# Patient Record
Sex: FEMALE | Race: WHITE | Employment: OTHER | URBAN - METROPOLITAN AREA
[De-identification: names, ages, dates, MRNs, and addresses within clinical notes are randomized per-mention and may not be internally consistent; named-entity substitution may affect disease eponyms.]

---

## 2019-12-17 ENCOUNTER — HOSPITAL ENCOUNTER (EMERGENCY)
Age: 24
Discharge: HOME | End: 2019-12-17
Payer: MEDICAID

## 2019-12-17 ENCOUNTER — APPOINTMENT (OUTPATIENT)
Dept: GENERAL RADIOLOGY | Age: 24
End: 2019-12-17
Payer: MEDICAID

## 2019-12-17 VITALS
TEMPERATURE: 98.6 F | OXYGEN SATURATION: 98 % | SYSTOLIC BLOOD PRESSURE: 102 MMHG | BODY MASS INDEX: 20.53 KG/M2 | HEART RATE: 60 BPM | DIASTOLIC BLOOD PRESSURE: 57 MMHG | RESPIRATION RATE: 17 BRPM | WEIGHT: 135 LBS

## 2019-12-17 LAB
A/G RATIO: 1.3 (ref 1.1–2.2)
ALBUMIN SERPL-MCNC: 4.8 G/DL (ref 3.4–5)
ALP BLD-CCNC: 53 U/L (ref 40–129)
ALT SERPL-CCNC: 9 U/L (ref 10–40)
ANION GAP SERPL CALCULATED.3IONS-SCNC: 12 MMOL/L (ref 3–16)
AST SERPL-CCNC: 20 U/L (ref 15–37)
BASOPHILS ABSOLUTE: 0 K/UL (ref 0–0.2)
BASOPHILS RELATIVE PERCENT: 0.3 %
BILIRUB SERPL-MCNC: 0.3 MG/DL (ref 0–1)
BILIRUBIN URINE: NEGATIVE
BLOOD, URINE: NEGATIVE
BUN BLDV-MCNC: 9 MG/DL (ref 7–20)
CALCIUM SERPL-MCNC: 9.1 MG/DL (ref 8.3–10.6)
CHLORIDE BLD-SCNC: 101 MMOL/L (ref 99–110)
CLARITY: CLEAR
CO2: 25 MMOL/L (ref 21–32)
COLOR: YELLOW
CREAT SERPL-MCNC: 0.6 MG/DL (ref 0.6–1.1)
EOSINOPHILS ABSOLUTE: 0.1 K/UL (ref 0–0.6)
EOSINOPHILS RELATIVE PERCENT: 1.6 %
GFR AFRICAN AMERICAN: >60
GFR NON-AFRICAN AMERICAN: >60
GLOBULIN: 3.6 G/DL
GLUCOSE BLD-MCNC: 86 MG/DL (ref 70–99)
GLUCOSE URINE: NEGATIVE MG/DL
HCG QUALITATIVE: NEGATIVE
HCT VFR BLD CALC: 43.2 % (ref 36–48)
HEMOGLOBIN: 15.2 G/DL (ref 12–16)
KETONES, URINE: NEGATIVE MG/DL
LEUKOCYTE ESTERASE, URINE: NEGATIVE
LIPASE: 21 U/L (ref 13–60)
LYMPHOCYTES ABSOLUTE: 0.5 K/UL (ref 1–5.1)
LYMPHOCYTES RELATIVE PERCENT: 6.4 %
MCH RBC QN AUTO: 32 PG (ref 26–34)
MCHC RBC AUTO-ENTMCNC: 35.2 G/DL (ref 31–36)
MCV RBC AUTO: 90.9 FL (ref 80–100)
MICROSCOPIC EXAMINATION: NORMAL
MONO TEST: NEGATIVE
MONOCYTES ABSOLUTE: 0.6 K/UL (ref 0–1.3)
MONOCYTES RELATIVE PERCENT: 7.1 %
NEUTROPHILS ABSOLUTE: 6.6 K/UL (ref 1.7–7.7)
NEUTROPHILS RELATIVE PERCENT: 84.6 %
NITRITE, URINE: NEGATIVE
PDW BLD-RTO: 13.2 % (ref 12.4–15.4)
PH UA: 6 (ref 5–8)
PLATELET # BLD: 229 K/UL (ref 135–450)
PMV BLD AUTO: 8.3 FL (ref 5–10.5)
POTASSIUM REFLEX MAGNESIUM: 4 MMOL/L (ref 3.5–5.1)
PROTEIN UA: NEGATIVE MG/DL
RAPID INFLUENZA  B AGN: NEGATIVE
RAPID INFLUENZA A AGN: NEGATIVE
RBC # BLD: 4.75 M/UL (ref 4–5.2)
SODIUM BLD-SCNC: 138 MMOL/L (ref 136–145)
SPECIFIC GRAVITY UA: 1.02 (ref 1–1.03)
TOTAL PROTEIN: 8.4 G/DL (ref 6.4–8.2)
URINE REFLEX TO CULTURE: NORMAL
URINE TYPE: NORMAL
UROBILINOGEN, URINE: 0.2 E.U./DL
WBC # BLD: 7.8 K/UL (ref 4–11)

## 2019-12-17 PROCEDURE — 6360000002 HC RX W HCPCS: Performed by: PHYSICIAN ASSISTANT

## 2019-12-17 PROCEDURE — 85025 COMPLETE CBC W/AUTO DIFF WBC: CPT

## 2019-12-17 PROCEDURE — 81003 URINALYSIS AUTO W/O SCOPE: CPT

## 2019-12-17 PROCEDURE — 86308 HETEROPHILE ANTIBODY SCREEN: CPT

## 2019-12-17 PROCEDURE — 87804 INFLUENZA ASSAY W/OPTIC: CPT

## 2019-12-17 PROCEDURE — 36415 COLL VENOUS BLD VENIPUNCTURE: CPT

## 2019-12-17 PROCEDURE — 96375 TX/PRO/DX INJ NEW DRUG ADDON: CPT

## 2019-12-17 PROCEDURE — 96374 THER/PROPH/DIAG INJ IV PUSH: CPT

## 2019-12-17 PROCEDURE — 83690 ASSAY OF LIPASE: CPT

## 2019-12-17 PROCEDURE — 96361 HYDRATE IV INFUSION ADD-ON: CPT

## 2019-12-17 PROCEDURE — 80053 COMPREHEN METABOLIC PANEL: CPT

## 2019-12-17 PROCEDURE — 84703 CHORIONIC GONADOTROPIN ASSAY: CPT

## 2019-12-17 PROCEDURE — 99284 EMERGENCY DEPT VISIT MOD MDM: CPT

## 2019-12-17 PROCEDURE — 71046 X-RAY EXAM CHEST 2 VIEWS: CPT

## 2019-12-17 PROCEDURE — 2580000003 HC RX 258: Performed by: PHYSICIAN ASSISTANT

## 2019-12-17 RX ORDER — ACETAMINOPHEN 325 MG/1
650 TABLET ORAL ONCE
Status: DISCONTINUED | OUTPATIENT
Start: 2019-12-17 | End: 2019-12-17 | Stop reason: HOSPADM

## 2019-12-17 RX ORDER — KETOROLAC TROMETHAMINE 30 MG/ML
15 INJECTION, SOLUTION INTRAMUSCULAR; INTRAVENOUS ONCE
Status: DISCONTINUED | OUTPATIENT
Start: 2019-12-17 | End: 2019-12-17

## 2019-12-17 RX ORDER — ONDANSETRON 2 MG/ML
4 INJECTION INTRAMUSCULAR; INTRAVENOUS ONCE
Status: COMPLETED | OUTPATIENT
Start: 2019-12-17 | End: 2019-12-17

## 2019-12-17 RX ORDER — MORPHINE SULFATE 4 MG/ML
4 INJECTION, SOLUTION INTRAMUSCULAR; INTRAVENOUS ONCE
Status: COMPLETED | OUTPATIENT
Start: 2019-12-17 | End: 2019-12-17

## 2019-12-17 RX ORDER — ONDANSETRON 4 MG/1
4 TABLET, ORALLY DISINTEGRATING ORAL EVERY 8 HOURS PRN
Qty: 15 TABLET | Refills: 0 | Status: SHIPPED | OUTPATIENT
Start: 2019-12-17 | End: 2020-09-25 | Stop reason: SDUPTHER

## 2019-12-17 RX ORDER — 0.9 % SODIUM CHLORIDE 0.9 %
1000 INTRAVENOUS SOLUTION INTRAVENOUS ONCE
Status: COMPLETED | OUTPATIENT
Start: 2019-12-17 | End: 2019-12-17

## 2019-12-17 RX ADMIN — ONDANSETRON 4 MG: 2 INJECTION INTRAMUSCULAR; INTRAVENOUS at 12:11

## 2019-12-17 RX ADMIN — MORPHINE SULFATE 4 MG: 4 INJECTION, SOLUTION INTRAMUSCULAR; INTRAVENOUS at 12:12

## 2019-12-17 RX ADMIN — SODIUM CHLORIDE 1000 ML: 9 INJECTION, SOLUTION INTRAVENOUS at 12:10

## 2019-12-17 ASSESSMENT — PAIN SCALES - GENERAL
PAINLEVEL_OUTOF10: 2
PAINLEVEL_OUTOF10: 3

## 2019-12-17 ASSESSMENT — ENCOUNTER SYMPTOMS
SORE THROAT: 1
COUGH: 1
COLOR CHANGE: 0
SHORTNESS OF BREATH: 0
DIARRHEA: 1
ABDOMINAL PAIN: 1
EYES NEGATIVE: 1
BACK PAIN: 1
VOMITING: 1
NAUSEA: 1

## 2019-12-17 NOTE — ED PROVIDER NOTES
[facility]  ED  EMERGENCY DEPARTMENT ENCOUNTER        Pt Name: Mattie Douglas  MRN: [id number]  [place] 1995  Date of evaluation: 12/17/2019  Provider: Valerie Mathew PA-C  PCP: No primary care provider on file. ED Attending: Chelo Mukherjee MD      This patient was not seen by the attending provider    History provided by the patient    CHIEF COMPLAINT:     Chief Complaint   Patient presents with    Emesis     started to get ill a week ago with uri symptoms, progressed into vomiting and diarrhea       HISTORY OF PRESENT ILLNESS:      Mattie Douglas is a 21 y.o. female who arrives to the ED by private vehicle with her mother. The patient reports living in [place] though has been in [place] since June of this year. She has been sick for the last 1 week with what she describes as upper respiratory symptoms including a sore throat, congestion and coughing. She additionally has a history of cyclical vomiting syndrome and describes beginning to feel ill early this morning. She has intermittent abdominal cramping that radiates to and from her mid to low back. She states she has had the symptoms for several months if not longer when she gets sick with her cyclical vomiting. She has been vomiting since this morning but has not tried anything for symptoms despite reportedly having Zofran at home. She denies any persistent fevers, headaches. She denies being in contact with anyone else known to be ill. He denies recent travel. Nursing Notes were reviewed     REVIEW OF SYSTEMS:     Review of Systems   Constitutional: Positive for activity change and appetite change. Negative for chills and fever. HENT: Positive for congestion and sore throat. Eyes: Negative. Respiratory: Positive for cough. Negative for shortness of breath. Cardiovascular: Negative for chest pain. Gastrointestinal: Positive for abdominal pain, diarrhea, nausea and vomiting.    Genitourinary: Negative for difficulty urinating and dysuria. Musculoskeletal: Positive for back pain and myalgias. Negative for gait problem, joint swelling and neck pain. Skin: Negative for color change and rash. Neurological: Negative for dizziness, weakness and headaches. All other systems reviewed and are negative. Except as noted above in the ROS, all other systems were reviewed and negative. PAST MEDICAL HISTORY:     Past Medical History:   Diagnosis Date    Cyclical vomiting     Depression age 11    Anxiety    History of chicken pox 96         SURGICAL HISTORY:    History reviewed. No pertinent surgical history.       CURRENT MEDICATIONS:       Discharge Medication List as of 2019 12:54 PM            ALLERGIES:    Phenergan [promethazine] and Zithromax [azithromycin]    FAMILY HISTORY:       Family History   Problem Relation Age of Onset    Cancer Mother 28        Melanoma    High Cholesterol Mother     Thyroid Disease Mother         Thyroid Removed, Hypothyroid    Depression Mother     Alcohol Abuse Father     Colon Cancer Maternal Grandmother 61         age 64 of colon cancer    Stroke Maternal Grandmother     Asthma Maternal Grandmother     Heart Disease Maternal Grandfather 61        MI    High Blood Pressure Maternal Grandfather           SOCIAL HISTORY:       Social History     Socioeconomic History    Marital status: Single     Spouse name: None    Number of children: 0    Years of education: HS Grad    Highest education level: None   Occupational History    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 Actress   Social Needs    Financial resource strain: None    Food insecurity:     Worry: None     Inability: None    Transportation needs:     Medical: None     Non-medical: None   Tobacco Use    Smoking status: Former Smoker     Packs/day: 1.00     Years: 2.00     Pack years: 2.00     Types: Cigarettes     Start date: 2011     Last attempt to quit: 2014     Years since quittin.4    Smokeless tobacco: Never Used   Substance and Sexual Activity    Alcohol use: Yes     Alcohol/week: 0.8 standard drinks     Types: 1 Standard drinks or equivalent per week     Comment: occasional, weekends    Drug use: No    Sexual activity: Not Currently     Partners: Male   Lifestyle    Physical activity:     Days per week: None     Minutes per session: None    Stress: None   Relationships    Social connections:     Talks on phone: None     Gets together: None     Attends Orthodox service: None     Active member of club or organization: None     Attends meetings of clubs or organizations: None     Relationship status: None    Intimate partner violence:     Fear of current or ex partner: None     Emotionally abused: None     Physically abused: None     Forced sexual activity: None   Other Topics Concern    None   Social History Narrative    None       SCREENINGS:    [place] Coma Scale  Eye Opening: Spontaneous  Best Verbal Response: Oriented  Best Motor Response: Obeys commands  [place] Coma Scale Score: 15        PHYSICAL EXAM:       ED Triage Vitals [19 1139]   BP Temp Temp Source Pulse Resp SpO2 Height Weight   117/66 98.6 °F (37 °C) Oral 84 19 99 % -- 135 lb (61.2 kg)       Physical Exam    CONSTITUTIONAL: Awake and alert. Cooperative. Well-developed. Well-nourished. Non-toxic. No acute distress. HENT: Normocephalic. Atraumatic. External ears normal, without discharge. No nasal discharge. Oropharynx clear. Mucous membranes moist.  No swelling of the posterior pharynx. No erythema or exudate. EYES: Conjunctiva non-injected. No scleral icterus. PERRL. EOM's grossly intact. NECK: Supple. Normal ROM. CARDIOVASCULAR: RRR. No [place]. Intact distal pulses. PULMONARY/CHEST WALL: Effort normal. No tachypnea. Lungs clear to ausculation. ABDOMEN: Normal BS. Soft. Nondistended. No tenderness to palpate. No guarding. No rebound. No rigidity. No CVA tenderness.   /ANORECTAL: Not assessed  MUSKULOSKELETAL: Normal ROM. No acute deformities. No edema. No tenderness to palpate. SKIN: Warm and dry. No rash. NEUROLOGICAL: Alert and oriented x 3. GCS 15. CN II-XII grossly intact. Strength is 5/5 in all extremities and sensation is intact. Normal gait.    PSYCHIATRIC: Normal affect        DIAGNOSTICRESULTS:     LABS:    Results for orders placed or performed during the hospital encounter of 12/17/19   Rapid influenza A/B antigens   Result Value Ref Range    Rapid Influenza A Ag Negative Negative    Rapid Influenza B Ag Negative Negative   CBC Auto Differential   Result Value Ref Range    WBC 7.8 4.0 - 11.0 K/uL    RBC 4.75 4.00 - 5.20 M/uL    Hemoglobin 15.2 12.0 - 16.0 g/dL    Hematocrit 43.2 36.0 - 48.0 %    MCV 90.9 80.0 - 100.0 fL    MCH 32.0 26.0 - 34.0 pg    MCHC 35.2 31.0 - 36.0 g/dL    RDW 13.2 12.4 - 15.4 %    Platelets 511 534 - 741 K/uL    MPV 8.3 5.0 - 10.5 fL    Neutrophils % 84.6 %    Lymphocytes % 6.4 %    Monocytes % 7.1 %    Eosinophils % 1.6 %    Basophils % 0.3 %    Neutrophils Absolute 6.6 1.7 - 7.7 K/uL    Lymphocytes Absolute 0.5 (L) 1.0 - 5.1 K/uL    Monocytes Absolute 0.6 0.0 - 1.3 K/uL    Eosinophils Absolute 0.1 0.0 - 0.6 K/uL    Basophils Absolute 0.0 0.0 - 0.2 K/uL   Comprehensive Metabolic Panel w/ Reflex to MG   Result Value Ref Range    Sodium 138 136 - 145 mmol/L    Potassium reflex Magnesium 4.0 3.5 - 5.1 mmol/L    Chloride 101 99 - 110 mmol/L    CO2 25 21 - 32 mmol/L    Anion Gap 12 3 - 16    Glucose 86 70 - 99 mg/dL    BUN 9 7 - 20 mg/dL    CREATININE 0.6 0.6 - 1.1 mg/dL    GFR Non-African American >60 >60    GFR African American >60 >60    Calcium 9.1 8.3 - 10.6 mg/dL    Total Protein 8.4 (H) 6.4 - 8.2 g/dL    Alb 4.8 3.4 - 5.0 g/dL    Albumin/Globulin Ratio 1.3 1.1 - 2.2    Total Bilirubin 0.3 0.0 - 1.0 mg/dL    Alkaline Phosphatase 53 40 - 129 U/L    ALT 9 (L) 10 - 40 U/L    AST 20 15 - 37 U/L    Globulin 3.6 g/dL   Lipase   Result Value Ref Range    Lipase no evidence or peritonitis, sepsis, or toxicity. Chip Marquis and I have discussed the diagnosis and risks, and we agree with discharging home to follow-up with their primary doctor. We also discussed returning to the Emergency Department immediately if new or worsening symptoms occur. We have discussed the symptoms which are most concerning (e.g., changing or worsening pain, trouble swallowing or breathing, neck stiffness, worsening fever) that necessitate immediate return. FINAL IMPRESSION:      1. Acute upper respiratory infection    2. Non-intractable vomiting with nausea, unspecified vomiting type    3. Non-intractable cyclical vomiting    4.  Abdominal cramping          DISPOSITION/PLAN:   DISPOSITION Decision To Discharge      PATIENT REFERRED TO:  [facility]) Pre-Services  [address]  ED  [address]  Go to   If symptoms worsen      DISCHARGE MEDICATIONS:  Discharge Medication List as of 12/17/2019 12:54 PM      START taking these medications    Details   ondansetron (ZOFRAN ODT) 4 MG disintegrating tablet Take 1 tablet by mouth every 8 hours as needed for Nausea or Vomiting, Disp-15 tablet, R-0Print                        (Please note thatportions of this note were completed with a voice recognition program.  Efforts were made to edit the dictations, but occasionally words are mis-transcribed.)    Marc Castellanos PA-C (electronicallysigned)              Janae Guevara  12/17/19 [id number]

## 2020-09-25 ENCOUNTER — APPOINTMENT (OUTPATIENT)
Dept: CT IMAGING | Age: 25
End: 2020-09-25
Payer: MEDICAID

## 2020-09-25 ENCOUNTER — HOSPITAL ENCOUNTER (EMERGENCY)
Age: 25
Discharge: HOME | End: 2020-09-25
Payer: MEDICAID

## 2020-09-25 VITALS
HEIGHT: 68 IN | OXYGEN SATURATION: 97 % | BODY MASS INDEX: 20.61 KG/M2 | WEIGHT: 136 LBS | HEART RATE: 66 BPM | RESPIRATION RATE: 20 BRPM | TEMPERATURE: 98.4 F | SYSTOLIC BLOOD PRESSURE: 104 MMHG | DIASTOLIC BLOOD PRESSURE: 65 MMHG

## 2020-09-25 LAB
A/G RATIO: 1.6 (ref 1.1–2.2)
ALBUMIN SERPL-MCNC: 4.4 G/DL (ref 3.4–5)
ALP BLD-CCNC: 46 U/L (ref 40–129)
ALT SERPL-CCNC: 18 U/L (ref 10–40)
ANION GAP SERPL CALCULATED.3IONS-SCNC: 11 MMOL/L (ref 3–16)
AST SERPL-CCNC: 50 U/L (ref 15–37)
BASOPHILS ABSOLUTE: 0.1 K/UL (ref 0–0.2)
BASOPHILS RELATIVE PERCENT: 1.1 %
BILIRUB SERPL-MCNC: 0.3 MG/DL (ref 0–1)
BILIRUBIN URINE: NEGATIVE
BLOOD, URINE: NEGATIVE
BUN BLDV-MCNC: 14 MG/DL (ref 7–20)
CALCIUM SERPL-MCNC: 8.8 MG/DL (ref 8.3–10.6)
CHLORIDE BLD-SCNC: 100 MMOL/L (ref 99–110)
CLARITY: CLEAR
CO2: 24 MMOL/L (ref 21–32)
COLOR: YELLOW
CREAT SERPL-MCNC: 0.7 MG/DL (ref 0.6–1.1)
EOSINOPHILS ABSOLUTE: 0.1 K/UL (ref 0–0.6)
EOSINOPHILS RELATIVE PERCENT: 1.6 %
GFR AFRICAN AMERICAN: >60
GFR NON-AFRICAN AMERICAN: >60
GLOBULIN: 2.8 G/DL
GLUCOSE BLD-MCNC: 101 MG/DL (ref 70–99)
GLUCOSE URINE: NEGATIVE MG/DL
HCG(URINE) PREGNANCY TEST: NEGATIVE
HCT VFR BLD CALC: 37.2 % (ref 36–48)
HEMOGLOBIN: 12.7 G/DL (ref 12–16)
KETONES, URINE: ABNORMAL MG/DL
LEUKOCYTE ESTERASE, URINE: NEGATIVE
LYMPHOCYTES ABSOLUTE: 1.9 K/UL (ref 1–5.1)
LYMPHOCYTES RELATIVE PERCENT: 39.7 %
MCH RBC QN AUTO: 31.8 PG (ref 26–34)
MCHC RBC AUTO-ENTMCNC: 34.2 G/DL (ref 31–36)
MCV RBC AUTO: 92.9 FL (ref 80–100)
MICROSCOPIC EXAMINATION: ABNORMAL
MONOCYTES ABSOLUTE: 0.3 K/UL (ref 0–1.3)
MONOCYTES RELATIVE PERCENT: 7.4 %
NEUTROPHILS ABSOLUTE: 2.3 K/UL (ref 1.7–7.7)
NEUTROPHILS RELATIVE PERCENT: 50.2 %
NITRITE, URINE: NEGATIVE
PDW BLD-RTO: 12.5 % (ref 12.4–15.4)
PH UA: 5.5 (ref 5–8)
PLATELET # BLD: 236 K/UL (ref 135–450)
PMV BLD AUTO: 8.4 FL (ref 5–10.5)
POTASSIUM SERPL-SCNC: 5.1 MMOL/L (ref 3.5–5.1)
PROTEIN UA: NEGATIVE MG/DL
RBC # BLD: 4.01 M/UL (ref 4–5.2)
SODIUM BLD-SCNC: 135 MMOL/L (ref 136–145)
SPECIFIC GRAVITY UA: >=1.03 (ref 1–1.03)
TOTAL PROTEIN: 7.2 G/DL (ref 6.4–8.2)
URINE TYPE: ABNORMAL
UROBILINOGEN, URINE: 0.2 E.U./DL
WBC # BLD: 4.7 K/UL (ref 4–11)

## 2020-09-25 PROCEDURE — 80053 COMPREHEN METABOLIC PANEL: CPT

## 2020-09-25 PROCEDURE — 85025 COMPLETE CBC W/AUTO DIFF WBC: CPT

## 2020-09-25 PROCEDURE — 99284 EMERGENCY DEPT VISIT MOD MDM: CPT

## 2020-09-25 PROCEDURE — 84703 CHORIONIC GONADOTROPIN ASSAY: CPT

## 2020-09-25 PROCEDURE — 81003 URINALYSIS AUTO W/O SCOPE: CPT

## 2020-09-25 PROCEDURE — 74176 CT ABD & PELVIS W/O CONTRAST: CPT

## 2020-09-25 RX ORDER — KETOROLAC TROMETHAMINE 30 MG/ML
30 INJECTION, SOLUTION INTRAMUSCULAR; INTRAVENOUS ONCE
Status: DISCONTINUED | OUTPATIENT
Start: 2020-09-25 | End: 2020-09-25 | Stop reason: HOSPADM

## 2020-09-25 RX ORDER — ONDANSETRON 2 MG/ML
4 INJECTION INTRAMUSCULAR; INTRAVENOUS EVERY 30 MIN PRN
Status: DISCONTINUED | OUTPATIENT
Start: 2020-09-25 | End: 2020-09-25 | Stop reason: HOSPADM

## 2020-09-25 RX ORDER — ONDANSETRON 4 MG/1
4 TABLET, ORALLY DISINTEGRATING ORAL EVERY 8 HOURS PRN
Qty: 15 TABLET | Refills: 0 | Status: SHIPPED | OUTPATIENT
Start: 2020-09-25 | End: 2021-07-16 | Stop reason: SDUPTHER

## 2020-09-25 ASSESSMENT — PAIN DESCRIPTION - ORIENTATION: ORIENTATION: LEFT;LOWER

## 2020-09-25 ASSESSMENT — PAIN SCALES - GENERAL
PAINLEVEL_OUTOF10: 7
PAINLEVEL_OUTOF10: 0

## 2020-09-25 ASSESSMENT — PAIN DESCRIPTION - PAIN TYPE: TYPE: ACUTE PAIN

## 2020-09-25 ASSESSMENT — PAIN DESCRIPTION - ONSET: ONSET: SUDDEN

## 2020-09-25 ASSESSMENT — PAIN DESCRIPTION - LOCATION: LOCATION: FLANK

## 2020-09-25 ASSESSMENT — PAIN DESCRIPTION - PROGRESSION: CLINICAL_PROGRESSION: GRADUALLY WORSENING

## 2020-09-25 ASSESSMENT — PAIN DESCRIPTION - DESCRIPTORS: DESCRIPTORS: SHARP;RADIATING

## 2020-09-25 ASSESSMENT — PAIN DESCRIPTION - FREQUENCY: FREQUENCY: CONTINUOUS

## 2020-09-25 NOTE — ED PROVIDER NOTES
Evaluated by [id number] Provider    [facility]  ED    CHIEF COMPLAINT  Flank Pain (Pt states she was sitting and watching TV PTA and a sudden L sided sharp pain began, worsens with movement.)    HISTORY OF PRESENT ILLNESS  Lilia Zaragoza is a 31 y.o. female who presents to the ED complaining of left side flank and abdominal pain. Having stabbing pains in the left side that started suddenly. Pain has gotten better and then worsened at times, currently reporting an aching type of pain. Nausea due to the pain, better now, no vomiting. Denies dysuria. Denies history of kidney stones. Sitting still and not straightening up all the way makes the pain better. The patient is currently rating their pain as 5/10 and describes it as an aching, stabbing type of pain. Treatments tried prior to arrival in the ED: none. The patient denies other complaints, modifying factors or associated symptoms. The patient arrived to the ED via private car. Patient is accompanied by SO whois/are bedside for the visit. PAST MEDICAL HISTORY    Past Medical History:   Diagnosis Date    Cyclical vomiting     Depression age 11    Anxiety    History of chicken pox 96       SURGICAL HISTORY    History reviewed. No pertinent surgical history.     CURRENT MEDICATIONS    Current Outpatient Rx   Medication Sig Dispense Refill    ondansetron (ZOFRAN ODT) 4 MG disintegrating tablet Take 1 tablet by mouth every 8 hours as needed for Nausea or Vomiting 15 tablet 0       ALLERGIES    Allergies   Allergen Reactions    Phenergan [Promethazine] Anxiety     agitated    Zithromax [Azithromycin] Anxiety       FAMILY HISTORY    Family History   Problem Relation Age of Onset    Cancer Mother 28        Melanoma    High Cholesterol Mother     Thyroid Disease Mother         Thyroid Removed, Hypothyroid    Depression Mother     Alcohol Abuse Father     Colon Cancer Maternal Grandmother 61         age 64 of colon cancer    Stroke Maternal Grandmother     Asthma Maternal Grandmother     Heart Disease Maternal Grandfather 61        MI    High Blood Pressure Maternal Grandfather        SOCIAL HISTORY    Social History     Socioeconomic History    Marital status: Single     Spouse name: None    Number of children: 0    Years of education: HS Grad    Highest education level: None   Occupational History    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 Actress   Social Needs    Financial resource strain: None    Food insecurity     Worry: None     Inability: None    Transportation needs     Medical: None     Non-medical: None   Tobacco Use    Smoking status: Former Smoker     Packs/day: 1.00     Years: 2.00     Pack years: 2.00     Types: Cigarettes     Start date: 2011     Last attempt to quit: 2014     Years since quittin.2    Smokeless tobacco: Never Used   Substance and Sexual Activity    Alcohol use: Yes     Alcohol/week: 0.8 standard drinks     Types: 1 Standard drinks or equivalent per week     Comment: occasional, weekends    Drug use: No    Sexual activity: Not Currently     Partners: Male   Lifestyle    Physical activity     Days per week: None     Minutes per session: None    Stress: None   Relationships    Social connections     Talks on phone: None     Gets together: None     Attends Orthodox service: None     Active member of club or organization: None     Attends meetings of clubs or organizations: None     Relationship status: None    Intimate partner violence     Fear of current or ex partner: None     Emotionally abused: None     Physically abused: None     Forced sexual activity: None   Other Topics Concern    None   Social History Narrative    None       REVIEW OFSYSTEMS    10systems reviewed, pertinent positives per HPI otherwise noted to be negative.     PHYSICAL EXAM  Physical Exam  Vitals:    20   BP: 105/70   Pulse: 71   Resp: 20   Temp:    SpO2: 96% GENERAL: Patient is well-developed, well-nourished. Awake andalert. Cooperative. Resting in bed. No apparent distress. HEENT:  Normocephalic, atraumatic. Conjunctivaappear normal. Sclera is non-icteric. External ears are normal.    NECK: Supple with normal ROM. Tracheamidline  LUNGS: Equal and symmetric chest rise. Breathing is unlabored. Speaking comfortably in fullsentences. Lungs are clear bilaterally to auscultation. Without wheezing, rales, or rhonchi. CADIOVASCULAR:  Regular rate and rhythm. Normal S1-S2 sounds. No murmurs, rubs, or gallops. GI: Soft, left flank tenderness to palpation, nondistended with positive bowel sounds. No rebound tenderness, guarding or rigidity. Negative Concepcion's sign. Negative Rovsing's sign. No CVAT to palpation. No masses or hepatosplenomegaly to palpation. MUSCULOSKELETAL:  No gross deformities or trauma noted. Moving all extremities equally and appropriately. Normal ROM. SKIN: Warm/dry. Skin is intact. No rashes or lesions noted. PSYCHIATRIC: Mood and affect appropriate. Speech is clear and articulate. NEUROLOGIC: Alert and oriented. No focal motor or sensory deficits.      LABS   Results for orders placed or performed during the hospital encounter of 09/25/20   Urinalysis, reflex to microscopic   Result Value Ref Range    Color, UA Yellow Straw/Yellow    Clarity, UA Clear Clear    Glucose, Ur Negative Negative mg/dL    Bilirubin Urine Negative Negative    Ketones, Urine TRACE (A) Negative mg/dL    Specific Gravity, UA >=1.030 1.005 - 1.030    Blood, Urine Negative Negative    pH, UA 5.5 5.0 - 8.0    Protein, UA Negative Negative mg/dL    Urobilinogen, Urine 0.2 <2.0 E.U./dL    Nitrite, Urine Negative Negative    Leukocyte Esterase, Urine Negative Negative    Microscopic Examination Not Indicated     Urine Type NotGiven    Pregnancy, Urine   Result Value Ref Range    HCG(Urine) Pregnancy Test Negative Detects HCG level >20 MIU/mL   CBC Auto Differential Result Value Ref Range    WBC 4.7 4.0 - 11.0 K/uL    RBC 4.01 4.00 - 5.20 M/uL    Hemoglobin 12.7 12.0 - 16.0 g/dL    Hematocrit 37.2 36.0 - 48.0 %    MCV 92.9 80.0 - 100.0 fL    MCH 31.8 26.0 - 34.0 pg    MCHC 34.2 31.0 - 36.0 g/dL    RDW 12.5 12.4 - 15.4 %    Platelets 845 892 - 998 K/uL    MPV 8.4 5.0 - 10.5 fL    Neutrophils % 50.2 %    Lymphocytes % 39.7 %    Monocytes % 7.4 %    Eosinophils % 1.6 %    Basophils % 1.1 %    Neutrophils Absolute 2.3 1.7 - 7.7 K/uL    Lymphocytes Absolute 1.9 1.0 - 5.1 K/uL    Monocytes Absolute 0.3 0.0 - 1.3 K/uL    Eosinophils Absolute 0.1 0.0 - 0.6 K/uL    Basophils Absolute 0.1 0.0 - 0.2 K/uL   Comprehensive Metabolic Panel   Result Value Ref Range    Sodium 135 (L) 136 - 145 mmol/L    Potassium 5.1 3.5 - 5.1 mmol/L    Chloride 100 99 - 110 mmol/L    CO2 24 21 - 32 mmol/L    Anion Gap 11 3 - 16    Glucose 101 (H) 70 - 99 mg/dL    BUN 14 7 - 20 mg/dL    CREATININE 0.7 0.6 - 1.1 mg/dL    GFR Non-African American >60 >60    GFR African American >60 >60    Calcium 8.8 8.3 - 10.6 mg/dL    Total Protein 7.2 6.4 - 8.2 g/dL    Alb 4.4 3.4 - 5.0 g/dL    Albumin/Globulin Ratio 1.6 1.1 - 2.2    Total Bilirubin 0.3 0.0 - 1.0 mg/dL    Alkaline Phosphatase 46 40 - 129 U/L    ALT 18 10 - 40 U/L    AST 50 (H) 15 - 37 U/L    Globulin 2.8 g/dL       RADIOLOGY    Ct Abdomen Pelvis Wo Contrast Additional Contrast? None    Result Date: 9/25/2020  EXAMINATION: CT OF THE ABDOMEN AND PELVIS WITHOUT CONTRAST 9/25/2020 5:05 pm TECHNIQUE: CT of the abdomen and pelvis was performed without the administration of intravenous contrast. Multiplanar reformatted images are provided for review. Dose modulation, iterative reconstruction, and/or weight based adjustment of the mA/kV was utilized to reduce the radiation dose to as low as reasonably achievable. COMPARISON: None.  HISTORY: ORDERING SYSTEM PROVIDED HISTORY: left flank pain, r/o stone TECHNOLOGIST PROVIDED HISTORY: Reason for exam:->left flank pain, medication/s. I did [place] patient on appropriate use of these medication. New Prescriptions    No medications on file       I estimatethere is LOW risk for ACUTE APPENDICITIS, BOWEL OBSTRUCTION, CHOLECYSTITIS, DIVERTICULITIS, INCARCERATED HERNIA, PANCREATITIS, or PERFORATED BOWEL or ULCER, thus I consider the discharge disposition reasonable. Also, thereis no evidence or peritonitis, sepsis, or toxicity. Evelyn Pierce and I have discussed the diagnosis and risks, and we agree with discharging home to follow-up with their primary doctor. We also discussed returning to the EmergencyDepartment immediately if new or worsening symptoms occur. We have discussed the symptoms which are most concerning (e.g., bloody stool, fever, changing or worsening pain, vomiting) that necessitate immediate return. CLINICAL IMPRESSION    1. Left flank pain    2. Nausea           Discharge Vitals:  Blood pressure 105/70, pulse 71, temperature 98.5 °F (36.9 °C), temperature source Oral, resp. rate 20, height 5' 8\" (1.727 m), weight 136 lb (61.7 kg), last menstrual period 09/04/2020, SpO2 96 %, not currently breastfeeding. [address] Pre-Services  [phone]  Call on 9/28/2020      [facility]  ED  [address] [facility] [address]  Go to   If symptoms worsen      DISPOSITION  Patient was discharged to home in good condition. Comment: Pleasenote this report has been produced using speech recognition software and may contain errors related to that system including errors in grammar, punctuation, and spelling, as well as words and phrases that may beinappropriate. If there are any questions or concerns please feel free to contact the dictating provider for clarification.         SERENA Bentley CNP  09/25/20 [id number]

## 2021-07-16 ENCOUNTER — HOSPITAL ENCOUNTER (EMERGENCY)
Age: 26
Discharge: HOME | End: 2021-07-17
Payer: MEDICAID

## 2021-07-16 ENCOUNTER — APPOINTMENT (OUTPATIENT)
Dept: GENERAL RADIOLOGY | Age: 26
End: 2021-07-16
Payer: MEDICAID

## 2021-07-16 DIAGNOSIS — U07.1 COVID-19: Primary | ICD-10-CM

## 2021-07-16 DIAGNOSIS — R50.9 ACUTE FEBRILE ILLNESS: ICD-10-CM

## 2021-07-16 DIAGNOSIS — R05.9 COUGH: ICD-10-CM

## 2021-07-16 DIAGNOSIS — M79.10 MYALGIA: ICD-10-CM

## 2021-07-16 LAB
A/G RATIO: 1.9 (ref 1.1–2.2)
ALBUMIN SERPL-MCNC: 5.1 G/DL (ref 3.4–5)
ALP BLD-CCNC: 54 U/L (ref 40–129)
ALT SERPL-CCNC: 15 U/L (ref 10–40)
ANION GAP SERPL CALCULATED.3IONS-SCNC: 12 MMOL/L (ref 3–16)
AST SERPL-CCNC: 23 U/L (ref 15–37)
BASOPHILS ABSOLUTE: 0 K/UL (ref 0–0.2)
BASOPHILS RELATIVE PERCENT: 1 %
BILIRUB SERPL-MCNC: 0.3 MG/DL (ref 0–1)
BUN BLDV-MCNC: 5 MG/DL (ref 7–20)
CALCIUM SERPL-MCNC: 9.2 MG/DL (ref 8.3–10.6)
CHLORIDE BLD-SCNC: 102 MMOL/L (ref 99–110)
CO2: 23 MMOL/L (ref 21–32)
CREAT SERPL-MCNC: 0.6 MG/DL (ref 0.6–1.1)
EOSINOPHILS ABSOLUTE: 0 K/UL (ref 0–0.6)
EOSINOPHILS RELATIVE PERCENT: 0.5 %
GFR AFRICAN AMERICAN: >60
GFR NON-AFRICAN AMERICAN: >60
GLOBULIN: 2.7 G/DL
GLUCOSE BLD-MCNC: 101 MG/DL (ref 70–99)
HCG QUALITATIVE: NEGATIVE
HCT VFR BLD CALC: 43.6 % (ref 36–48)
HEMOGLOBIN: 15.3 G/DL (ref 12–16)
LACTIC ACID, SEPSIS: 0.9 MMOL/L (ref 0.4–1.9)
LYMPHOCYTES ABSOLUTE: 0.2 K/UL (ref 1–5.1)
LYMPHOCYTES RELATIVE PERCENT: 9.3 %
MCH RBC QN AUTO: 31.7 PG (ref 26–34)
MCHC RBC AUTO-ENTMCNC: 35.2 G/DL (ref 31–36)
MCV RBC AUTO: 90.2 FL (ref 80–100)
MONOCYTES ABSOLUTE: 0.3 K/UL (ref 0–1.3)
MONOCYTES RELATIVE PERCENT: 13.4 %
NEUTROPHILS ABSOLUTE: 1.6 K/UL (ref 1.7–7.7)
NEUTROPHILS RELATIVE PERCENT: 75.8 %
PDW BLD-RTO: 13.2 % (ref 12.4–15.4)
PLATELET # BLD: 182 K/UL (ref 135–450)
PMV BLD AUTO: 8.5 FL (ref 5–10.5)
POTASSIUM REFLEX MAGNESIUM: 4 MMOL/L (ref 3.5–5.1)
RBC # BLD: 4.83 M/UL (ref 4–5.2)
SARS-COV-2, NAAT: DETECTED
SODIUM BLD-SCNC: 137 MMOL/L (ref 136–145)
SPECIMEN STATUS: NORMAL
TOTAL PROTEIN: 7.8 G/DL (ref 6.4–8.2)
TROPONIN: <0.01 NG/ML
WBC # BLD: 2.1 K/UL (ref 4–11)

## 2021-07-16 PROCEDURE — 99285 EMERGENCY DEPT VISIT HI MDM: CPT

## 2021-07-16 PROCEDURE — 96374 THER/PROPH/DIAG INJ IV PUSH: CPT

## 2021-07-16 PROCEDURE — 80053 COMPREHEN METABOLIC PANEL: CPT

## 2021-07-16 PROCEDURE — 87635 SARS-COV-2 COVID-19 AMP PRB: CPT

## 2021-07-16 PROCEDURE — 6360000002 HC RX W HCPCS: Performed by: NURSE PRACTITIONER

## 2021-07-16 PROCEDURE — 84703 CHORIONIC GONADOTROPIN ASSAY: CPT

## 2021-07-16 PROCEDURE — 71045 X-RAY EXAM CHEST 1 VIEW: CPT

## 2021-07-16 PROCEDURE — 83605 ASSAY OF LACTIC ACID: CPT

## 2021-07-16 PROCEDURE — 84484 ASSAY OF TROPONIN QUANT: CPT

## 2021-07-16 PROCEDURE — 96375 TX/PRO/DX INJ NEW DRUG ADDON: CPT

## 2021-07-16 PROCEDURE — 85025 COMPLETE CBC W/AUTO DIFF WBC: CPT

## 2021-07-16 PROCEDURE — 6370000000 HC RX 637 (ALT 250 FOR IP): Performed by: NURSE PRACTITIONER

## 2021-07-16 PROCEDURE — 2580000003 HC RX 258: Performed by: NURSE PRACTITIONER

## 2021-07-16 RX ORDER — KETOROLAC TROMETHAMINE 30 MG/ML
30 INJECTION, SOLUTION INTRAMUSCULAR; INTRAVENOUS ONCE
Status: DISCONTINUED | OUTPATIENT
Start: 2021-07-16 | End: 2021-07-17 | Stop reason: HOSPADM

## 2021-07-16 RX ORDER — ONDANSETRON 2 MG/ML
4 INJECTION INTRAMUSCULAR; INTRAVENOUS EVERY 30 MIN PRN
Status: DISCONTINUED | OUTPATIENT
Start: 2021-07-16 | End: 2021-07-17 | Stop reason: HOSPADM

## 2021-07-16 RX ORDER — DEXTROSE AND SODIUM CHLORIDE 5; .9 G/100ML; G/100ML
2000 INJECTION, SOLUTION INTRAVENOUS ONCE
Status: COMPLETED | OUTPATIENT
Start: 2021-07-16 | End: 2021-07-17

## 2021-07-16 RX ORDER — ONDANSETRON 4 MG/1
4 TABLET, ORALLY DISINTEGRATING ORAL EVERY 8 HOURS PRN
Qty: 15 TABLET | Refills: 0 | Status: SHIPPED | OUTPATIENT
Start: 2021-07-16

## 2021-07-16 RX ORDER — ACETAMINOPHEN 500 MG
1000 TABLET ORAL ONCE
Status: COMPLETED | OUTPATIENT
Start: 2021-07-16 | End: 2021-07-16

## 2021-07-16 RX ADMIN — ACETAMINOPHEN 1000 MG: 500 TABLET ORAL at 21:09

## 2021-07-16 RX ADMIN — DEXTROSE AND SODIUM CHLORIDE 2000 ML: 5; 900 INJECTION, SOLUTION INTRAVENOUS at 21:20

## 2021-07-16 ASSESSMENT — PAIN DESCRIPTION - PROGRESSION: CLINICAL_PROGRESSION: GRADUALLY IMPROVING

## 2021-07-16 ASSESSMENT — PAIN SCALES - GENERAL
PAINLEVEL_OUTOF10: 7
PAINLEVEL_OUTOF10: 5

## 2021-07-17 VITALS
OXYGEN SATURATION: 97 % | SYSTOLIC BLOOD PRESSURE: 99 MMHG | HEART RATE: 77 BPM | TEMPERATURE: 99.1 F | DIASTOLIC BLOOD PRESSURE: 56 MMHG | HEIGHT: 68 IN | BODY MASS INDEX: 18.49 KG/M2 | RESPIRATION RATE: 15 BRPM | WEIGHT: 122 LBS

## 2021-07-17 PROCEDURE — 96375 TX/PRO/DX INJ NEW DRUG ADDON: CPT

## 2021-07-17 PROCEDURE — 6360000002 HC RX W HCPCS: Performed by: NURSE PRACTITIONER

## 2021-07-17 PROCEDURE — 96374 THER/PROPH/DIAG INJ IV PUSH: CPT

## 2021-07-17 RX ORDER — DEXAMETHASONE SODIUM PHOSPHATE 10 MG/ML
10 INJECTION INTRAMUSCULAR; INTRAVENOUS ONCE
Status: COMPLETED | OUTPATIENT
Start: 2021-07-17 | End: 2021-07-17

## 2021-07-17 RX ORDER — DIPHENHYDRAMINE HYDROCHLORIDE 50 MG/ML
25 INJECTION INTRAMUSCULAR; INTRAVENOUS ONCE
Status: COMPLETED | OUTPATIENT
Start: 2021-07-17 | End: 2021-07-17

## 2021-07-17 RX ORDER — ALBUTEROL SULFATE 90 UG/1
AEROSOL, METERED RESPIRATORY (INHALATION)
Qty: 1 INHALER | Refills: 0 | Status: SHIPPED | OUTPATIENT
Start: 2021-07-17

## 2021-07-17 RX ADMIN — DIPHENHYDRAMINE HYDROCHLORIDE 25 MG: 50 INJECTION, SOLUTION INTRAMUSCULAR; INTRAVENOUS at 00:29

## 2021-07-17 RX ADMIN — ONDANSETRON 4 MG: 2 INJECTION INTRAMUSCULAR; INTRAVENOUS at 00:23

## 2021-07-17 RX ADMIN — DEXAMETHASONE SODIUM PHOSPHATE 10 MG: 10 INJECTION INTRAMUSCULAR; INTRAVENOUS at 00:23

## 2021-07-17 NOTE — ED NOTES
Discussed with patient alternating acetaminophen and ibuprofen for fever control. Reviewed taking medications every 6 hours as directed on packages. For example: take acetaminophen then three hours later take ibuprofen then three hours later take acetaminophen then take ibuprofen three hours later. By doing that something is given every three hours for fever reduction and comfort. Discussed viral illness with patient. You can get a influenza by touching your eyes or nose after you touch surfaces with viruses on them. You can also inhale the virus. Symptoms usually begin 2 or 3 days after exposed to the virus and last 2 to 14 days. Washing your hands and staying away from people with upper respiratory infections will help you avoid exposure to the viruses. There is no cure for viral illnesses, but there are treatments that can make you feel better while you wait for the influenza to go away on its own:   Getting plenty of rest    Drinking fluids    Gargling with warm salt water    Using cough drops or throat sprays    Taking over-the-counter pain or cold medicines as prescribed or recommended. Discussed the use of prescribed nausea medication. Encouraged patient to wait 20 to 30 minutes after taking medication before attempting to eat or drink. Recommended clear liquids only for the next 6 to 12 hours then slowly advance diet as tolerated. Patient voiced understanding. No additional questions asked. Discussed with patient alternating acetaminophen and ibuprofen for pain control. Reviewed taking medications every 6 hours as directed on packages. For example: take acetaminophen then three hours later take ibuprofen then three hours later take acetaminophen then take ibuprofen three hours later. By doing that something is given every three hours for pain reduction and comfort. Discharge instructions reviewed with patient. Reviewed prescriptions with patient. No additional questions asked. Voiced understanding. Encouraged patient to follow up as discussed by the ED physician.      Jann Leon RN  07/17/21 [id number]

## 2021-07-17 NOTE — ED PROVIDER NOTES
Evaluated by Advanced Practice Provider    EMERGENCY DEPARTMENT ENCOUNTER      CHIEFCOMPLAINT  Fever (x 1 day. reports 101.5 F at home, took ibuprofen and now 99.9 F on arrival today. also c/o body aches. reports she did not recieve COVID vaccines)    KRYSTLE Huang is a 31 y.o. female who presents to the emergency department with complaints of fever, body aches, cough. Yesterday started having fevers on and off. Fever this evening was 101.7, this morning was 100.5. She reports every part of her body hurts and is aching., She couldn't sleep. Cough is present. Cough started this morning, Sometimes when she wakes up she has something that is yellow. Denies nausea, vomiting. Denies abdominal pain or diarrhea. States she has a dry throat, it is not sore. No HA, no ear pain, sinus/nasal congestion or runny nose. Her Step-Dad and Mom are both sick, they have not been tested for anything as her step-dad's sister just passed away, she was in hospice. She has not had COVID. She has not had the vaccine. She took ibuprofen today, 3 of the OTC at 4 pm.     The patient arrived to the ED via private car. PAST MEDICAL HISTORY    Past Medical History:   Diagnosis Date    Cyclical vomiting     Depression age 11    Anxiety    History of chicken pox 6/5/96       SURGICAL HISTORY    History reviewed. No pertinent surgical history. CURRENT MEDICATIONS    Current Outpatient Rx   Medication Sig Dispense Refill    albuterol sulfate HFA (PROAIR HFA) 108 (90 Base) MCG/ACT inhaler Use 1-2 puffs every 4 hours while awake as needed for cough/wheezing  Dispense with SPACER and Instruct on use. May sub Ventolin or Proventil as needed per [employer].  1 Inhaler 0    ondansetron (ZOFRAN ODT) 4 MG disintegrating tablet Take 1 tablet by mouth every 8 hours as needed for Nausea or Vomiting 15 tablet 0       ALLERGIES    Allergies   Allergen Reactions    Phenergan [Promethazine] Anxiety     agitated    Zithromax [Azithromycin]  Attends Yazidism Services:     Active Member of Clubs or Organizations:     Attends Club or Organization Meetings:     Marital Status:    Intimate Partner Violence:     Fear of Current or Ex-Partner:     Emotionally Abused:     Physically Abused:     Sexually Abused:        REVIEW OF SYSTEMS    10 systems reviewed, pertinent positives per HPI otherwise noted to be negative    PHYSICAL EXAM  Physical Exam  Vitals:    07/16/21 2245   BP: (!) 111/58   Pulse: 80   Resp:    Temp:    SpO2: 100%       GENERAL: Patient is well-developed, thin. Awake and alert. Cooperative. Resting in bed. No apparent distress. HEENT:  Normocephalic, atraumatic. Conjunctiva appear normal. Sclera is non-icteric. External ears are normal.    NECK: Supple with normal ROM. Trachea midline  LUNGS: Equal and symmetric chest rise. Breathing is unlabored. Speaking comfortably in full sentences. Lungs are clear bilaterally to auscultation. Without wheezing, rales, or rhonchi. CADIOVASCULAR:  Regular rate and rhythm. Normal S1-S2 sounds. No murmurs, rubs, or gallops. Capillary refill is brisk in all 4extremities. Bilateral lower extremities are equal in size, there is no swelling observed. There is no tenderness to palpation. There is no erythema observed or warmth palpated. GI: Soft, nontender, nondistended with positive bowelsounds. No rebound tenderness, guarding or any peritoneal signs. No masses or hepatosplenomegaly   MUSCULOSKELETAL:  No gross deformities or trauma noted. Moving allextremities equally and appropriately. Normal ROM. SKIN: Warm/dry. Skin is intact. Norashes/lesions noted. PSYCHIATRIC: Mood and affect appropriate. Speech is clear andarticulate. NEUROLOGIC: Alert and oriented. No focal motor or sensory deficits. LABS  I havereviewed all labs for this visit.    Results for orders placed or performed during the hospital encounter of 07/16/21   COVID-19, Rapid   Result Value Ref Range    SARS-CoV-2, NAAT DETECTED (AA) Not Detected   CBC Auto Differential   Result Value Ref Range    WBC 2.1 (L) 4.0 - 11.0 K/uL    RBC 4.83 4.00 - 5.20 M/uL    Hemoglobin 15.3 12.0 - 16.0 g/dL    Hematocrit 43.6 36.0 - 48.0 %    MCV 90.2 80.0 - 100.0 fL    MCH 31.7 26.0 - 34.0 pg    MCHC 35.2 31.0 - 36.0 g/dL    RDW 13.2 12.4 - 15.4 %    Platelets 297 508 - 310 K/uL    MPV 8.5 5.0 - 10.5 fL    Neutrophils % 75.8 %    Lymphocytes % 9.3 %    Monocytes % 13.4 %    Eosinophils % 0.5 %    Basophils % 1.0 %    Neutrophils Absolute 1.6 (L) 1.7 - 7.7 K/uL    Lymphocytes Absolute 0.2 (L) 1.0 - 5.1 K/uL    Monocytes Absolute 0.3 0.0 - 1.3 K/uL    Eosinophils Absolute 0.0 0.0 - 0.6 K/uL    Basophils Absolute 0.0 0.0 - 0.2 K/uL   Comprehensive Metabolic Panel w/ Reflex to MG   Result Value Ref Range    Sodium 137 136 - 145 mmol/L    Potassium reflex Magnesium 4.0 3.5 - 5.1 mmol/L    Chloride 102 99 - 110 mmol/L    CO2 23 21 - 32 mmol/L    Anion Gap 12 3 - 16    Glucose 101 (H) 70 - 99 mg/dL    BUN 5 (L) 7 - 20 mg/dL    CREATININE 0.6 0.6 - 1.1 mg/dL    GFR Non-African American >60 >60    GFR African American >60 >60    Calcium 9.2 8.3 - 10.6 mg/dL    Total Protein 7.8 6.4 - 8.2 g/dL    Albumin 5.1 (H) 3.4 - 5.0 g/dL    Albumin/Globulin Ratio 1.9 1.1 - 2.2    Total Bilirubin 0.3 0.0 - 1.0 mg/dL    Alkaline Phosphatase 54 40 - 129 U/L    ALT 15 10 - 40 U/L    AST 23 15 - 37 U/L    Globulin 2.7 g/dL   Lactate, Sepsis   Result Value Ref Range    Lactic Acid, Sepsis 0.9 0.4 - 1.9 mmol/L   HCG Qualitative, Serum   Result Value Ref Range    hCG Qual Negative Detects HCG level >10 MIU/mL   Sample possible blood bank testing   Result Value Ref Range    Specimen Status ANTONIO    Troponin   Result Value Ref Range    Troponin <0.01 <0.01 ng/mL       RADIOLOGY    XR CHEST PORTABLE    Result Date: 7/16/2021  EXAMINATION: ONE XRAY VIEW OF THE CHEST 7/16/2021 9:00 pm COMPARISON: Chest x-ray 12/17/2019.  HISTORY: ORDERING SYSTEM PROVIDED HISTORY: cough TECHNOLOGIST PROVIDED HISTORY: Reason for exam:->cough Reason for Exam: cough Acuity: Acute Type of Exam: Initial FINDINGS: Cardiac silhouette is within normal limits in size. Mediastinal contours are otherwise suboptimally evaluated due to projection. Lungs demonstrate no focal consolidation or pleural effusion. No airspace disease by radiograph. ED COURSE/MDM  Patient seen and evaluated. Old records reviewed. Diagnostic testing reviewed and results discussed. This patient was also seen and evaluated by No att. providers found. We thoroughly discussed thehistory, physical exam, diagnostic testing and emergency department course. Bisi Marquez presented to the ED with the above noted complaints. Physical exam reveals no adventitious breath sounds on exam.  She has no reproducible abdominal tenderness to palpation. There is a leukopenia as WBC are low at 2.1. Absolute neutrophils low at 1.6 and absolute lymphocytes low at 0.2. No further differential shift. No anemia. No electrolyte abnormality. No evidence of acute kidney injury or transaminitis. Lactic acid levels normal.  Pregnancy test is negative. Covid swab was obtained and positive. Chest x-ray is without acute findings. Patient was well saturated while here in the ED, % on room air. Because she does have Covid we did ambulate her in the hallway on pulse oximeter and she did decrease to 88%. When she was sitting she did go back up into the low 90s. Her decrease was brief. Patient also continued to complain of nausea and was wanting to go home. Patient had been given Tylenol, she refused Toradol stating it makes her jittery, she was given 2 L D5 normal saline fluid bolus. Because of the brief low O2 sat I did give her 10 mg of Decadron IV and apparently immediately after this was given she started having itching and per the nurse \"going crazy\". She was given 25 mg of Benadryl IV.     Despite the brief drop in her O2 sat while walking I do feel like she can be discharged as the rest of her work-up has been unremarkable. She does not have evidence of pneumonia on her chest x-ray. She has been afebrile here and hemodynamically stable. I did discharge her with a pulse oximeter and she was given strict ED return precautions. We also discussed over-the-counter medications to help with symptom control, rest, isolation due to her being Covid positive. Pt was given the following medications ortreatments in the ED:   Medications   ketorolac (TORADOL) injection 30 mg (30 mg Intravenous Not Given 7/16/21 2108)   ondansetron (ZOFRAN) injection 4 mg (has no administration in time range)   dexamethasone (DECADRON) injection 10 mg (has no administration in time range)   dextrose 5 % and 0.9 % sodium chloride infusion (2,000 mLs Intravenous New Bag 7/16/21 2120)   acetaminophen (TYLENOL) tablet 1,000 mg (1,000 mg Oral Given 7/16/21 2109)     At this point I do not feel the patient requires further work up and it is reasonable to discharge the patient. Please refer to AVS for further details regarding dischargeinstructions. A discussion was had with the patient regarding diagnosis, diagnostic testing results,treatment/ plan of care, and follow up. All questions were answered. Patient will follow up as directed for further evaluation/treatment. The patient was given strict return precautions as we discussed symptoms that wouldnecessitate return to the ED. Patient will return to ED for new/worsening symptoms. The patient verbalized their understanding and agreement with the above plan. I estimate there is LOW risk for PULMONARY EMBOLISM, ACUTE CORONARY SYNDROME, OR THORACIC AORTIC DISSECTION, thus I consider the discharge disposition reasonable. Sharron Solomon and I have discussed the diagnosisand risks, and we agree with discharging home to follow-up with their primary doctor.  We also discussed returning to the Emergency Department immediately if new or worsening symptoms occur. We have discussed the symptomswhich are most concerning (e.g., bloody sputum, fever, worsening pain or shortness of breath, vomiting) that necessitate immediate return. was sent home with a prescription for below medication/s. I did [place] patient on appropriate use of these medication. New Prescriptions    ALBUTEROL SULFATE HFA (PROAIR HFA) 108 (90 BASE) MCG/ACT INHALER    Use 1-2 puffs every 4 hours while awake as needed for cough/wheezing  Dispense with SPACER and Instruct on use. May sub Ventolin or Proventil as needed per [employer]. CLINICAL IMPRESSION    1. COVID-19    2. Cough    3. Myalgia    4. Acute febrile illness           Discharge Vitals:  Blood pressure (!) 111/58, pulse 80, temperature 99.9 °F (37.7 °C), temperature source Oral, resp. rate 18, height 5' 8\" (1.727 m), weight 122 lb (55.3 kg), last menstrual period 07/14/2021, SpO2 100 %, not currently breastfeeding. FOLLOW UP  Mart[place] DO  [address] [place] [address]  [phone]    Call in 3 days  For further evaluation    [facility]  ED  [address] [facility] [address]  Go to   If symptoms worsen      DISPOSITION  Patient was discharged to home in good condition. Comment: Please note this report has been produced using speech recognition software and may contain errors related to that system including errorsin grammar, punctuation, and spelling, as well as words and phrases that may be inappropriate. If there are any questions or concerns please feel free to contact the dictating provider for clarification.        SERENA Huggins - CNP  07/17/21 9539

## 2021-07-17 NOTE — ED NOTES
Ambulated patient about 200 feet on room air per [facility] APRN-CNP. Patient ambulated with normal gait without assistance. Patients Spo2 dropped to 88 % while ambulating. Patient was walked back to bed and Spo2 increased to 91% once sitting. Patient states she is nauseous and she \"just wants to go home. \" ED RN Larayne Meckel and NP Candance March aware.        Anni Gonzalez  07/17/21 0034

## 2021-07-19 ENCOUNTER — CARE COORDINATION (OUTPATIENT)
Dept: CARE COORDINATION | Age: 26
End: 2021-07-19

## 2021-07-19 NOTE — CARE COORDINATION
Patient contacted regarding COVID-19 diagnosis and pulse oximeter ordered at discharge. Discussed COVID-19 related testing which was available at this time. Test results were positive. Patient informed of results, if available? Yes. Ambulatory Care Manager contacted the patient by telephone to perform post discharge assessment. Call within 2 business days of discharge: Yes. Verified name and  with patient as identifiers. Provided introduction to self, and explanation of the CTN/ACM role, and reason for call due to risk factors for infection and/or exposure to COVID-19. Symptoms reviewed with patient who verbalized the following symptoms: fever, fatigue, pain or aching joints, cough, nausea, no new symptoms, no worsening symptoms and low back pain, patient said she has not gotten any worse, but is not getting any better either. Due to no new or worsening symptoms encounter was not routed to provider for escalation. Discussed follow-up appointments. If no appointment was previously scheduled, appointment scheduling offered: Patient said she lives in [place] and her insurance will only pay for ED visits. [address] follow up appointment(s): No future appointments. Non-[facility] follow up appointment(s): N/A    Non-face-to-face services provided:  Obtained and reviewed discharge summary and/or continuity of care documents     Advance Care Planning:   Does patient have an Advance Directive:  not on file. Educated patient about risk for severe COVID-19 due to risk factors according to CDC guidelines. ACM reviewed discharge instructions, medical action plan and red flag symptoms with the patient who verbalized understanding. Discussed COVID vaccination status: Yes. Education provided on COVID-19 vaccination as appropriate. Discussed exposure protocols and quarantine with CDC Guidelines.  Patient was given an opportunity to verbalize any questions and concerns and agrees to contact ACM or health care provider

## 2021-07-21 ENCOUNTER — CARE COORDINATION (OUTPATIENT)
Dept: CARE COORDINATION | Age: 26
End: 2021-07-21

## 2021-07-21 NOTE — CARE COORDINATION
Outreach attempt since last contact. Patient was unable to reach, ACM left HIPAA compliant message with contact information.       Plan   F/U attempt 2 since last contact to check on patient status     Son Avalos RN  Ambulatory Care Manager  [phone]

## 2021-07-28 ENCOUNTER — CARE COORDINATION (OUTPATIENT)
Dept: CARE COORDINATION | Age: 26
End: 2021-07-28

## 2021-07-28 NOTE — CARE COORDINATION
You Patient resolved from the Care Transitions episode on 07/28/21  Discussed COVID-19 related testing which was available at this time. Test results were positive. Patient informed of results, if available? Yes    Patient/family has been provided the following resources and education related to COVID-19:                         Signs, symptoms and red flags related to COVID-19            CDC exposure and quarantine guidelines            [employer] exposure contact - [phone]            Contact for their local Department of Health                 Patient currently reports that the following symptoms have improved:  had some nausea last evening that has since resolved, cough and no new/worsening symptoms     Summary  Outbound call made to patient to follow up since last contact. Patient is doing okay, does continue with a cough, and had some nausea yesterday evening that has resolved. Patient plans on returning to [place] as soon as her symptoms have resolved. Patient denies having any other questions are concerns currently. No further outreach scheduled with this CTN/ACM. Episode of Care resolved. Patient has this CTN/ACM contact information if future needs arise.

## 2021-09-13 ENCOUNTER — HOSPITAL ENCOUNTER (EMERGENCY)
Age: 26
Discharge: HOME | End: 2021-09-13
Payer: MEDICAID

## 2021-09-13 VITALS
BODY MASS INDEX: 17.73 KG/M2 | SYSTOLIC BLOOD PRESSURE: 117 MMHG | HEART RATE: 65 BPM | RESPIRATION RATE: 18 BRPM | TEMPERATURE: 98 F | WEIGHT: 117 LBS | OXYGEN SATURATION: 99 % | DIASTOLIC BLOOD PRESSURE: 61 MMHG | HEIGHT: 68 IN

## 2021-09-13 DIAGNOSIS — F41.1 ANXIETY STATE: Primary | ICD-10-CM

## 2021-09-13 PROCEDURE — 6370000000 HC RX 637 (ALT 250 FOR IP): Performed by: NURSE PRACTITIONER

## 2021-09-13 PROCEDURE — 99283 EMERGENCY DEPT VISIT LOW MDM: CPT

## 2021-09-13 RX ORDER — LORAZEPAM 1 MG/1
1 TABLET ORAL ONCE
Status: COMPLETED | OUTPATIENT
Start: 2021-09-13 | End: 2021-09-13

## 2021-09-13 RX ADMIN — LORAZEPAM 1 MG: 1 TABLET ORAL at 17:58

## 2021-09-13 ASSESSMENT — PAIN SCALES - GENERAL: PAINLEVEL_OUTOF10: 6

## 2021-09-13 ASSESSMENT — PAIN DESCRIPTION - PAIN TYPE: TYPE: ACUTE PAIN

## 2021-09-13 ASSESSMENT — PAIN DESCRIPTION - LOCATION: LOCATION: HEAD

## 2021-09-15 NOTE — ED PROVIDER NOTES
[address] [place] [address]  ED  EMERGENCY DEPARTMENT ENCOUNTER      This patient was not seen and evaluated by the attending physician. Pt Name: Marie Gaspar  MRN: [id number]  [place] 1995  Date of evaluation: 9/13/2021  Provider: SERENA Espinoza - CNP-C  PCP: No primary care provider on file. History provided by the patient. CHIEFCOMPLAINT:     Chief Complaint   Patient presents with    Anxiety     Pt tearful in triage. pt reports that she's \"an emotial train wreck\" for the last 2 weeks. reports that she is irritable and unable to control her emotions. denies SI/HI. pt reports that she is generally fatigued now and \"everything hurts\" because she has been crying nonstop       HISTORY OF PRESENT ILLNESS:      Marie Gaspar is a 31 y.o. female who presents to [address] [place] [address]  ED with complaints of anxiety, patient crying when I initially evaluated, patient states that for the last couple weeks she has been [place] emotional train wreck\". Patient states that she is very irritable and is having trouble controlling her emotions states that she is been crying for 2 days now. Patient denies any suicidal or homicidal ideations, states that she does not have a counselor anybody she sees from a mental health standpoint. Patient states that she checked her oxygen levels over the last couple days and she has had readings as low as 60%. She is not in any acute respiratory distress sitting in the bed. She is here for further evaluation. LOCATION:head  QUALITY:ache  SEVERITY:6  DURATION:several weeks  MODIFYING FACTORS:none noted    Nursing Notes were reviewed     REVIEW OF SYSTEMS:     Review of Systems  All systems, a total of 10, are reviewed and negative except for those that were just noted in history present illness.         PAST MEDICAL HISTORY:     Past Medical History:   Diagnosis Date    COVID-19 05/02/3124    Cyclical vomiting     Depression age 11    Anxiety    History of chicken pox 1996         SURGICAL HISTORY:    History reviewed. No pertinent surgical history. CURRENT MEDICATIONS:       Discharge Medication List as of 2021  7:11 PM      CONTINUE these medications which have NOT CHANGED    Details   albuterol sulfate HFA (PROAIR HFA) 108 (90 Base) MCG/ACT inhaler Use 1-2 puffs every 4 hours while awake as needed for cough/wheezing  Dispense with SPACER and Instruct on use. May sub Ventolin or Proventil as needed per Insurance., Disp-1 Inhaler, R-0Print      ondansetron (ZOFRAN ODT) 4 MG disintegrating tablet Take 1 tablet by mouth every 8 hours as needed for Nausea or Vomiting, Disp-15 tablet, R-0Normal               ALLERGIES:    Toradol [ketorolac tromethamine], Dexamethasone, Phenergan [promethazine], and Zithromax [azithromycin]    FAMILY HISTORY:       Family History   Problem Relation Age of Onset    Cancer Mother 28        Melanoma    High Cholesterol Mother     Thyroid Disease Mother         Thyroid Removed, Hypothyroid    Depression Mother     Alcohol Abuse Father     Colon Cancer Maternal Grandmother 61         age 64 of colon cancer    Stroke Maternal Grandmother     Asthma Maternal Grandmother     Heart Disease Maternal Grandfather 61        MI    High Blood Pressure Maternal Grandfather           SOCIAL HISTORY:     Social History     Socioeconomic History    Marital status: Single     Spouse name: None    Number of children: 0    Years of education: HS Grad    Highest education level: None   Occupational History    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 Actress   Tobacco Use    Smoking status: Former Smoker     Packs/day: 1.00     Years: 2.00     Pack years: 2.00     Types: Cigarettes     Start date: 2011     Quit date: 2014     Years since quittin.2    Smokeless tobacco: Never Used   Substance and Sexual Activity    Alcohol use:  Yes     Alcohol/week: 0.8 standard drinks     Types: 1 Standard drinks or equivalent per week     Comment: occasional, weekends    Drug use: No    Sexual activity: Not Currently     Partners: Male   Other Topics Concern    None   Social History Narrative    None     Social Determinants of Health     Financial Resource Strain:     Difficulty of Paying Living Expenses:    Food Insecurity:     Worried About Running Out of Food in the Last Year:     Ran Out of Food in the Last Year:    Transportation Needs:     Lack of Transportation (Medical):  Lack of Transportation (Non-Medical):    Physical Activity:     Days of Exercise per Week:     Minutes of Exercise per Session:    Stress:     Feeling of Stress :    Social Connections:     Frequency of Communication with Friends and Family:     Frequency of Social Gatherings with Friends and Family:     Attends [employer] Services:     Active Member of Clubs or Organizations:     Attends Club or Organization Meetings:     Marital Status:    Intimate Partner Violence:     Fear of Current or Ex-Partner:     Emotionally Abused:     Physically Abused:     Sexually Abused:        SCREENINGS:             PHYSICAL EXAM:       ED Triage Vitals   BP Temp Temp Source Pulse Resp SpO2 Height Weight   09/13/21 1703 09/13/21 1703 09/13/21 1703 09/13/21 1703 09/13/21 1703 09/13/21 1703 09/13/21 1659 09/13/21 1659   129/73 98 °F (36.7 °C) Oral 118 24 96 % 5' 8\" (1.727 m) 117 lb (53.1 kg)       Physical Exam    CONSTITUTIONAL: Awake and alert. Cooperative. Well-developed. Well-nourished. Vitals:    09/13/21 1659 09/13/21 1703 09/13/21 1906   BP:  129/73 117/61   Pulse:  118 65   Resp:  24 18   Temp:  98 °F (36.7 °C)    TempSrc:  Oral    SpO2:  96% 99%   Weight: 117 lb (53.1 kg)     Height: 5' 8\" (1.727 m)       HENT: Normocephalic. Atraumatic. External ears normal, without discharge. TMs clear bilaterally. Nonasal discharge. Oropharynx clear, no erythema. Mucous membranes moist.  EYES: Conjunctiva non-injected, nolid abnormalities noted.  No states that she has been crying for the last couple days. Patient physical exam was very unremarkable, she told me she had had oxygen saturations in the 60s however her oxygen saturation today is perfect she is in no respiratory distress with clear lungs her vital signs are normal.  I initially give the patient some Ativan, when I reassessed the patient she was still very upset, states that something is wrong and nobody is taking her seriously, I explained her that I saw no indications from a medical standpoint that patient has an acute emergent condition given her normal vital signs, I explained her that her 60% reading on her pulse oximeter was likely not an accurate reading giving her vital signs currently and physical exam.  Patient became very upset, states that she does not even remember somebody taking her vital signs, she started to perseverate on this and was saying that this is evidence that she has an acute medical emergency. Significant other is at the bedside, I explained her that I do feel that she has medical issues I do believe that they are psychogenic in nature and that she needs to see a counselor or see somebody for her mental health. She denied suicidal or homicidal ideations multiple times. Patient was very irritated with her work-up, she is very confrontational with anything that I tell her. I do feel very comfortable discharging patient home, no evidence of an acute emergent medical condition, again she denied suicidal or homicidal ideations multiple times. I did [facility] with [facility] and they did place follow-up information for patient to call, patient significant other verbalized understanding of instructions for her to follow-up. She can return to the ED for any worsening symptoms. Patient laboratory studies, radiographic imaging, and assessment were all discussed with the patient and/orpatient family.   There was shared decision-making between myself as well as the patient and/or their surrogate and we are all in agreement with discharge home. There was an opportunity for questions and all questions were answered tothe best of my ability and to the satisfaction of the patient and/or patient family. FINAL IMPRESSION:      1.  Anxiety state          DISPOSITION/PLAN:   DISPOSITION Decision To Discharge      PATIENT REFERRED TO:  [facility]  ED  [address]  Go to   If symptoms worsen    Ugo Carr DO  [address] [address]  [phone]    Call   For follow up      DISCHARGE MEDICATIONS:  Discharge Medication List as of 9/13/2021  7:11 PM                     (Please note thatportions of this note were completed with a voice recognition program.  Efforts were made to edit the dictations, but occasionally words are mis-transcribed.)    SERENA Escobar - CNP-C (electronicallysigned)        SERENA Escobar CNP  09/15/21 9819

## 2021-10-14 ENCOUNTER — APPOINTMENT (OUTPATIENT)
Dept: CT IMAGING | Age: 26
End: 2021-10-14
Payer: MEDICAID

## 2021-10-14 ENCOUNTER — HOSPITAL ENCOUNTER (EMERGENCY)
Age: 26
Discharge: HOME | End: 2021-10-14
Payer: MEDICAID

## 2021-10-14 VITALS
HEIGHT: 68 IN | WEIGHT: 118 LBS | HEART RATE: 81 BPM | OXYGEN SATURATION: 100 % | DIASTOLIC BLOOD PRESSURE: 78 MMHG | BODY MASS INDEX: 17.88 KG/M2 | TEMPERATURE: 98 F | SYSTOLIC BLOOD PRESSURE: 124 MMHG | RESPIRATION RATE: 14 BRPM

## 2021-10-14 DIAGNOSIS — R11.2 NAUSEA AND VOMITING, INTRACTABILITY OF VOMITING NOT SPECIFIED, UNSPECIFIED VOMITING TYPE: ICD-10-CM

## 2021-10-14 DIAGNOSIS — R51.9 NONINTRACTABLE HEADACHE, UNSPECIFIED CHRONICITY PATTERN, UNSPECIFIED HEADACHE TYPE: Primary | ICD-10-CM

## 2021-10-14 PROCEDURE — 96375 TX/PRO/DX INJ NEW DRUG ADDON: CPT

## 2021-10-14 PROCEDURE — 6360000002 HC RX W HCPCS: Performed by: NURSE PRACTITIONER

## 2021-10-14 PROCEDURE — 99284 EMERGENCY DEPT VISIT MOD MDM: CPT

## 2021-10-14 PROCEDURE — 96372 THER/PROPH/DIAG INJ SC/IM: CPT

## 2021-10-14 PROCEDURE — 96374 THER/PROPH/DIAG INJ IV PUSH: CPT

## 2021-10-14 PROCEDURE — 6370000000 HC RX 637 (ALT 250 FOR IP): Performed by: NURSE PRACTITIONER

## 2021-10-14 PROCEDURE — 2580000003 HC RX 258: Performed by: NURSE PRACTITIONER

## 2021-10-14 RX ORDER — ONDANSETRON 4 MG/1
4 TABLET, ORALLY DISINTEGRATING ORAL EVERY 8 HOURS PRN
Qty: 20 TABLET | Refills: 0 | Status: SHIPPED | OUTPATIENT
Start: 2021-10-14 | End: 2021-10-14 | Stop reason: SDUPTHER

## 2021-10-14 RX ORDER — DIPHENHYDRAMINE HYDROCHLORIDE 50 MG/ML
12.5 INJECTION INTRAMUSCULAR; INTRAVENOUS ONCE
Status: COMPLETED | OUTPATIENT
Start: 2021-10-14 | End: 2021-10-14

## 2021-10-14 RX ORDER — BUTALBITAL, ACETAMINOPHEN AND CAFFEINE 50; 325; 40 MG/1; MG/1; MG/1
1 TABLET ORAL EVERY 6 HOURS PRN
Qty: 30 TABLET | Refills: 0 | Status: SHIPPED | OUTPATIENT
Start: 2021-10-14

## 2021-10-14 RX ORDER — DROPERIDOL 2.5 MG/ML
1.25 INJECTION, SOLUTION INTRAMUSCULAR; INTRAVENOUS ONCE
Status: COMPLETED | OUTPATIENT
Start: 2021-10-14 | End: 2021-10-14

## 2021-10-14 RX ORDER — KETOROLAC TROMETHAMINE 30 MG/ML
30 INJECTION, SOLUTION INTRAMUSCULAR; INTRAVENOUS ONCE
Status: COMPLETED | OUTPATIENT
Start: 2021-10-14 | End: 2021-10-14

## 2021-10-14 RX ORDER — ONDANSETRON 4 MG/1
4 TABLET, ORALLY DISINTEGRATING ORAL EVERY 8 HOURS PRN
Qty: 20 TABLET | Refills: 0 | Status: SHIPPED | OUTPATIENT
Start: 2021-10-14

## 2021-10-14 RX ORDER — 0.9 % SODIUM CHLORIDE 0.9 %
1000 INTRAVENOUS SOLUTION INTRAVENOUS ONCE
Status: COMPLETED | OUTPATIENT
Start: 2021-10-14 | End: 2021-10-14

## 2021-10-14 RX ORDER — LORAZEPAM 1 MG/1
1 TABLET ORAL ONCE
Status: COMPLETED | OUTPATIENT
Start: 2021-10-14 | End: 2021-10-14

## 2021-10-14 RX ORDER — METOCLOPRAMIDE HYDROCHLORIDE 5 MG/ML
10 INJECTION INTRAMUSCULAR; INTRAVENOUS ONCE
Status: COMPLETED | OUTPATIENT
Start: 2021-10-14 | End: 2021-10-14

## 2021-10-14 RX ADMIN — LORAZEPAM 1 MG: 1 TABLET ORAL at 18:07

## 2021-10-14 RX ADMIN — DIPHENHYDRAMINE HYDROCHLORIDE 12.5 MG: 50 INJECTION, SOLUTION INTRAMUSCULAR; INTRAVENOUS at 17:11

## 2021-10-14 RX ADMIN — SODIUM CHLORIDE 1000 ML: 9 INJECTION, SOLUTION INTRAVENOUS at 17:09

## 2021-10-14 RX ADMIN — METOCLOPRAMIDE 10 MG: 5 INJECTION, SOLUTION INTRAMUSCULAR; INTRAVENOUS at 17:11

## 2021-10-14 RX ADMIN — KETOROLAC TROMETHAMINE 30 MG: 30 INJECTION, SOLUTION INTRAMUSCULAR at 17:11

## 2021-10-14 RX ADMIN — DROPERIDOL 1.25 MG: 2.5 INJECTION, SOLUTION INTRAMUSCULAR; INTRAVENOUS at 17:17

## 2021-10-14 ASSESSMENT — PAIN SCALES - GENERAL
PAINLEVEL_OUTOF10: 6
PAINLEVEL_OUTOF10: 10

## 2021-10-14 NOTE — ED PROVIDER NOTES
[facility] Emergency Department    CHIEF COMPLAINT  Headache (started a couple of hours ago) and Emesis      HISTORY OF PRESENT ILLNESS  Anshul Russo is a 31 y.o. female with a pertinent history of cyclic vomiting who presents to the ED complaining of headache and emesis. Patient denies head injury. Patient denies blurred or loss of vision, numbness, tingling, extremity weakness, slurred speech, abdominal pain, diarrhea, constipation, urinary complaints. Patient did not take anything for pain prior to arrival.    No other complaints, modifying factors or associated symptoms. Nursing notes reviewed. Past Medical History:   Diagnosis Date    COVID-19     Cyclical vomiting     Depression age 11    Anxiety    History of chicken pox 1996     History reviewed. No pertinent surgical history. Family History   Problem Relation Age of Onset   [facility] Cancer Mother 28        Melanoma    High Cholesterol Mother     Thyroid Disease Mother         Thyroid Removed, Hypothyroid    Depression Mother     Alcohol Abuse Father     Colon Cancer Maternal Grandmother 61         age 64 of colon cancer    Stroke Maternal Grandmother     Asthma Maternal Grandmother     Heart Disease Maternal Grandfather 61        MI    High Blood Pressure Maternal Grandfather      Social History     Socioeconomic History    Marital status: Single     Spouse name: Not on file    Number of children: 0    Years of education: HS Grad    Highest education level: Not on file   Occupational History    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 Actress   Tobacco Use    Smoking status: Former Smoker     Packs/day: 1.00     Years: 2.00     Pack years: 2.00     Types: Cigarettes     Start date: 2011     Quit date: 2014     Years since quittin.2    Smokeless tobacco: Never Used   Substance and Sexual Activity    Alcohol use:  Yes     Alcohol/week: 0.8 standard drinks Types: 1 Standard drinks or equivalent per week     Comment: occasional, weekends    Drug use: No    Sexual activity: Not Currently     Partners: Male   Other Topics Concern    Not on file   Social History Narrative    Not on file     Social Determinants of Health     Financial Resource Strain:     Difficulty of Paying Living Expenses:    Food Insecurity:     Worried About Running Out of Food in the Last Year:     [address] in the Last Year:    Transportation Needs:     Lack of Transportation (Medical):  Lack of Transportation (Non-Medical):    Physical Activity:     Days of Exercise per Week:     Minutes of Exercise per Session:    Stress:     Feeling of Stress :    Social Connections:     Frequency of Communication with Friends and Family:     Frequency of Social Gatherings with Friends and Family:     Attends [employer] Services:     Active Member of Clubs or Organizations:     Attends Club or Organization Meetings:     Marital Status:    Intimate Partner Violence:     Fear of Current or Ex-Partner:     Emotionally Abused:     Physically Abused:     Sexually Abused:      No current facility-administered medications for this encounter. Current Outpatient Medications   Medication Sig Dispense Refill    Amphetamine-Dextroamphetamine (ADDERALL PO) Take by mouth      Sertraline HCl (ZOLOFT PO) Take by mouth      butalbital-acetaminophen-caffeine (FIORICET, ESGIC) -40 MG per tablet Take 1 tablet by mouth every 6 hours as needed for Headaches 30 tablet 0    ondansetron (ZOFRAN ODT) 4 MG disintegrating tablet Take 1 tablet by mouth every 8 hours as needed for Nausea 20 tablet 0    albuterol sulfate HFA (PROAIR HFA) 108 (90 Base) MCG/ACT inhaler Use 1-2 puffs every 4 hours while awake as needed for cough/wheezing  Dispense with SPACER and Instruct on use. May sub Ventolin or Proventil as needed per [employer].  1 Inhaler 0    ondansetron (ZOFRAN ODT) 4 MG disintegrating tablet Take 1 tablet by mouth every 8 hours as needed for Nausea or Vomiting 15 tablet 0     Allergies   Allergen Reactions    Toradol [Ketorolac Tromethamine] Itching    Dexamethasone Nausea And Vomiting    Phenergan [Promethazine] Anxiety     agitated    Zithromax [Azithromycin] Anxiety       REVIEW OF SYSTEMS  10 systems reviewed, pertinent positives per HPI otherwise noted to be negative    PHYSICAL EXAM  /78   Pulse 81   Temp 98 °F (36.7 °C)   Resp 14   Ht 5' 8\" (1.727 m)   Wt 118 lb (53.5 kg)   LMP 10/14/2021   SpO2 100%   BMI 17.94 kg/m²   GENERAL APPEARANCE: Awake and alert. Cooperative. No acute distress. Vital signs are stable. Well appearing and non toxic. HEAD: Normocephalic. Atraumatic. EYES: PERRL. EOM's grossly intact. ENT: Mucous membranes are moist.   NECK: Supple. Normal ROM. HEART: RRR. Distal pulses are equal and intact. Cap refill less than 2 seconds. LUNGS: Respirations unlabored. CTAB. Good air exchange. Speaking comfortably in full sentences. ABDOMEN: Soft. Non-distended. Non-tender. No guarding or rebound. No rigidity. Bowel sounds present. Negative chong's. Negative McBurney's point. Negative CVA tenderness. EXTREMITIES: No peripheral edema. Moves all extremities equally. All extremities neurovascularly intact. SKIN: Warm and dry. No acute rashes. NEUROLOGICAL: Alert and oriented. No gross facial drooping. Speech is clear. CN II through XII intact. No focal neuro deficit. Strength 5/5, sensation intact. PSYCHIATRIC: Normal mood and affect. ED COURSE/MDM  Patient seen and evaluated. Old records reviewed. Diagnostic testing reviewed and results discussed. I have independently evaluated this patient based upon my scope of practice. Supervising physician was in the department for consultation as needed. Hu Cotto presented to the ED today with above noted complaints.   Upon arrival to emergency department patient is actively vomiting with a headache. Physical examination reassuring. Patient given Reglan, Benadryl, Toradol, sodium chloride bolus, droperidol. Patient did have severe anxiety with initial medication administration therefore she was given Ativan with good relief. Vomiting has resolved. Headache also improved. Patient was offered a CT of the head and declined. While in ED patient received   Medications   metoclopramide (REGLAN) injection 10 mg (10 mg IntraVENous Given 10/14/21 1711)   diphenhydrAMINE (BENADRYL) injection 12.5 mg (12.5 mg IntraVENous Given 10/14/21 1711)   ketorolac (TORADOL) injection 30 mg (30 mg IntraVENous Given 10/14/21 1711)   0.9 % sodium chloride bolus (0 mLs IntraVENous Stopped 10/14/21 1807)   droperidol (INAPSINE) injection 1.25 mg (1.25 mg IntraMUSCular Given 10/14/21 1717)   LORazepam (ATIVAN) tablet 1 mg (1 mg Oral Given 10/14/21 1807)             At this point I do not feel the patient requires further work up and it is reasonable to discharge the patient. A discussion was had with the patient and/or their surrogate regarding diagnosis, diagnostic testing results, treatment/ plan of care, and follow up. There was shared decision-making between myself as well as the patient and/or their surrogate and we are all in agreement with discharge home. There was an opportunity for questions and all questions were answered to the best of my ability and to the satisfaction of the patient and/or patient family. Patient will follow up with pcp, GI, neuro  for further evaluation/treatment. The patient was given strict return precautions as we discussed symptoms that would necessitate return to the ED. Patient will return to ED for new/worsening symptoms. The patient verbalized their understanding and agreement with the above plan. Please refer to AVS for further details regarding discharge instructions. No results found for this visit on 10/14/21.     I estimate there is LOW risk for SUBARACHNOID HEMORRHAGE, MENINGITIS, INTRACRANIAL HEMORRHAGE, SUBDURAL HEMATOMA, OR STROKE, thus I consider the discharge disposition reasonable. Anne Mclean and I have discussed the diagnosis and risks, and we agree with discharging home to follow-up with their primary doctor. We also discussed returning to the Emergency Department immediately if new or worsening symptoms occur. We have discussed the symptoms which are most concerning (e.g., changing or worsening pain, weakness, vomiting, fever) that necessitate immediate return. Final Impression    1. Nonintractable headache, unspecified chronicity pattern, unspecified headache type    2. Nausea and vomiting, intractability of vomiting not specified, unspecified vomiting type        Discharge Vital Signs:  Blood pressure 124/78, pulse 81, temperature 98 °F (36.7 °C), resp. rate 14, height 5' 8\" (1.727 m), weight 118 lb (53.5 kg), last menstrual period 10/14/2021, SpO2 100 %, not currently breastfeeding.  mdm    Patient was sent home with a prescription for below medication/s. I did [place] patient on appropriate use of these medication. Discharge Medication List as of 10/14/2021  7:19 PM      START taking these medications    Details   butalbital-acetaminophen-caffeine (FIORICET, ESGIC) -40 MG per tablet Take 1 tablet by mouth every 6 hours as needed for Headaches, Disp-30 tablet, R-0Print                 FOLLOW UP  Maryan Weaver MD  [address]  [phone]    Call   follow up    [address] [address]  [facility]  ED  [address]          DISPOSITION  Patient was discharged to home in good condition.      Comment: Please note this report has been produced using speech recognition software and may contain errors related to that system including errors in grammar, punctuation, and spelling, as well as words and phrases that may be inappropriate. If there are any questions or concerns please feel free to contact the dictating provider for clarification.             Savanna Horton, APRN - CNP  10/14/21 1927

## 2021-10-31 ENCOUNTER — HOSPITAL ENCOUNTER (EMERGENCY)
Age: 26
Discharge: HOME | End: 2021-10-31
Payer: MEDICAID

## 2021-10-31 VITALS
BODY MASS INDEX: 17.58 KG/M2 | TEMPERATURE: 98.1 F | HEART RATE: 71 BPM | SYSTOLIC BLOOD PRESSURE: 112 MMHG | OXYGEN SATURATION: 100 % | WEIGHT: 116 LBS | RESPIRATION RATE: 16 BRPM | DIASTOLIC BLOOD PRESSURE: 78 MMHG | HEIGHT: 68 IN

## 2021-10-31 DIAGNOSIS — S61.459A CAT BITE OF HAND, INITIAL ENCOUNTER: Primary | ICD-10-CM
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
## 2021-10-31 PROCEDURE — 99284 EMERGENCY DEPT VISIT MOD MDM: CPT

## 2021-10-31 RX ORDER — AMOXICILLIN AND CLAVULANATE POTASSIUM 875; 125 MG/1; MG/1
1 TABLET, FILM COATED ORAL 2 TIMES DAILY
Qty: 20 TABLET | Refills: 0 | Status: SHIPPED | OUTPATIENT
Start: 2021-10-31 | End: 2021-11-10

## 2021-10-31 RX ORDER — FLUCONAZOLE 150 MG/1
150 TABLET ORAL ONCE
Qty: 1 TABLET | Refills: 0 | Status: SHIPPED | OUTPATIENT
Start: 2021-10-31 | End: 2021-10-31

## 2021-10-31 RX ORDER — AMOXICILLIN AND CLAVULANATE POTASSIUM 875; 125 MG/1; MG/1
1 TABLET, FILM COATED ORAL ONCE
Status: DISCONTINUED | OUTPATIENT
Start: 2021-10-31 | End: 2021-10-31

## 2021-10-31 ASSESSMENT — PAIN DESCRIPTION - ORIENTATION: ORIENTATION: LEFT

## 2021-10-31 ASSESSMENT — PAIN DESCRIPTION - PAIN TYPE: TYPE: ACUTE PAIN

## 2021-10-31 ASSESSMENT — PAIN SCALES - GENERAL: PAINLEVEL_OUTOF10: 2

## 2021-10-31 ASSESSMENT — PAIN DESCRIPTION - LOCATION: LOCATION: HAND

## 2021-10-31 NOTE — ED NOTES
Pt's hand wound being cleaned and dressed by technician. Pt declining Rabies vaccination at this time. States, \"I have a Dr friend in [place] and they recommended not to put my body through that, so I would like it cleaned and the antibiotics. \"  This RN notifies Lisa HALE. PA verbalizes understanding and to IWR to discuss plan of care and discharge with pt.      Percy Haywood RN  10/31/21 2044

## 2021-10-31 NOTE — ED NOTES
Educated pt on x2 prescriptions and discharge paperwork as well as follow-up appointment. Pt verbalizes understanding of all instructions and denies questions. Pt left ambulatory by self with all personal belongings, and discharge paperwork to private residence. Pt in no distress at this time. Prescriptions x2 sent as E-Scripts. Pt instructed on how to  prescriptions. Pt verbalizes understanding.          Fnay Vivas RN  10/31/21 8226

## 2021-11-02 NOTE — ED PROVIDER NOTES
[facility] Emergency Department    CHIEF COMPLAINT  Animal Bite (States she was attempting to feed stray cats last night. States that kitten bit and scracthed L hand and thumb. Bleeding controlled at triage.)      [facility] VISIT  Evaluated by BENITEZ. My supervising physician was available for consultation. HISTORY OF PRESENT ILLNESS  Carlos Resendez is a 31 y.o. female who presents to the ED complaining of cat bite and scratch to the left hand and thumb. Patient states that last night she was attempting to feed a stray cat. She went to  the cat and it turned and bit her on the left hand and scratched her left hand and forearm. Patient states her tetanus is up-to-date. She believes that the cats were dropped off in a box the night before. She believes the cat is working normally, restarted when she went to pick them up. Patient is unsure regarding her rabies vaccination. No other complaints, modifying factors or associated symptoms. Nursing notes reviewed. Past Medical History:   Diagnosis Date    COVID-19     Cyclical vomiting     Depression age 11    Anxiety    History of chicken pox 1996     No past surgical history on file.   Family History   Problem Relation Age of Onset    Cancer Mother 28        Melanoma    High Cholesterol Mother     Thyroid Disease Mother         Thyroid Removed, Hypothyroid    Depression Mother     Alcohol Abuse Father     Colon Cancer Maternal Grandmother 61         age 64 of colon cancer    Stroke Maternal Grandmother     Asthma Maternal Grandmother     Heart Disease Maternal Grandfather 61        MI    High Blood Pressure Maternal Grandfather      Social History     Socioeconomic History    Marital status: Single     Spouse name: Not on file    Number of children: 0    Years of education: HS Grad    Highest education level: Not on file   Occupational History    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   Tobacco Use    Smoking status: Former Smoker     Packs/day: 1.00     Years: 2.00     Pack years: 2.00     Types: Cigarettes     Start date: 2011     Quit date: 2014     Years since quittin.3    Smokeless tobacco: Never Used   Substance and Sexual Activity    Alcohol use: Yes     Alcohol/week: 0.8 standard drinks     Types: 1 Standard drinks or equivalent per week     Comment: occasional, weekends    Drug use: No    Sexual activity: Not Currently     Partners: Male   Other Topics Concern    Not on file   Social History Narrative    Not on file     Social Determinants of Health     Financial Resource Strain:     Difficulty of Paying Living Expenses:    Food Insecurity:     Worried About Running Out of Food in the Last Year:     Ran Out of Food in the Last Year:    Transportation Needs:     Lack of Transportation (Medical):  Lack of Transportation (Non-Medical):    Physical Activity:     Days of Exercise per Week:     Minutes of Exercise per Session:    Stress:     Feeling of Stress :    Social Connections:     Frequency of Communication with Friends and Family:     Frequency of Social Gatherings with Friends and Family:     Attends Mandaen Services:     Active Member of Clubs or Organizations:     Attends Club or Organization Meetings:     Marital Status:    Intimate Partner Violence:     Fear of Current or Ex-Partner:     Emotionally Abused:     Physically Abused:     Sexually Abused:      No current facility-administered medications for this encounter.      Current Outpatient Medications   Medication Sig Dispense Refill    amoxicillin-clavulanate (AUGMENTIN) 875-125 MG per tablet Take 1 tablet by mouth 2 times daily for 10 days 20 tablet 0    Amphetamine-Dextroamphetamine (ADDERALL PO) Take by mouth      Sertraline HCl (ZOLOFT PO) Take by mouth      butalbital-acetaminophen-caffeine (FIORICET, ESGIC) -40 MG per tablet Take 1 tablet by mouth every 6 hours as needed for Headaches 30 tablet 0    ondansetron (ZOFRAN ODT) 4 MG disintegrating tablet Take 1 tablet by mouth every 8 hours as needed for Nausea 20 tablet 0    albuterol sulfate HFA (PROAIR HFA) 108 (90 Base) MCG/ACT inhaler Use 1-2 puffs every 4 hours while awake as needed for cough/wheezing  Dispense with SPACER and Instruct on use. May sub Ventolin or Proventil as needed per [employer]. 1 Inhaler 0    ondansetron (ZOFRAN ODT) 4 MG disintegrating tablet Take 1 tablet by mouth every 8 hours as needed for Nausea or Vomiting 15 tablet 0     Allergies   Allergen Reactions    Toradol [Ketorolac Tromethamine] Itching    Dexamethasone Nausea And Vomiting    Phenergan [Promethazine] Anxiety     agitated    Zithromax [Azithromycin] Anxiety       REVIEW OF SYSTEMS  7 systems reviewed, pertinent positives per HPI otherwise noted to be negative    PHYSICAL EXAM  /78   Pulse 71   Temp 98.1 °F (36.7 °C) (Oral)   Resp 16   Ht 5' 8\" (1.727 m)   Wt 116 lb (52.6 kg)   LMP 10/14/2021   SpO2 100%   BMI 17.64 kg/m²   GENERAL APPEARANCE: Awake and alert. Cooperative. HEAD: Normocephalic. Atraumatic. EYES: PERRL.   ENT: Mucous membranes are moist.   NECK: Supple. HEART: RRR. No murmurs. LUNGS: Respirations unlabored. CTAB. Good air exchange. Speaking comfortably in full sentences. EXTREMITIES: Superficial puncture wound over the palmar aspect of the left thumb. There is a superficial laceration in the posterior aspect of the hand extending past the wrist.  No open wounds that would require closure. No foreign bodies. No overlying cellulitis. No swelling of the digit. No tenderness over palpation to the flexor tendons. Compartments are soft. Neurovascular intact. No erythema. No peripheral edema. Moves all extremities equally. SKIN: Warm and dry. No acute rashes. See above. NEUROLOGICAL: Alert and oriented. No gross facial drooping.   PSYCHIATRIC: Normal mood and affect. RADIOLOGY      LABS  Labs Reviewed - No data to display    PROCEDURES  Unless otherwise noted below, none  Procedures      MDM  31 female presents emergency department for evaluation of a cat bite to her left hand after she attempted to  a stray animal the night before. Tetanus up-to-date. Vitals within normal limits. On exam there is no appreciation of any development of cellulitis or flexor tenosynovitis. No foreign bodies appreciated. There is a superficial laceration however does not require primary closure. Patient was started on Augmentin twice daily x7 days. Patient was given fluconazole. Discussed with patient rabies vaccination. I did offer it to her given the fact that this animal stray and is unable to be monitored. However after speaking with her friend she states is a doctor she ultimately denied the rabies vaccination. Discussed with patient that there is always a risk of developing rabies especially when the animal cannot be monitored and its vaccination status is unknown. However patient was aware that she could return anytime if she would wish to receive this vaccination. Briana with her the importance of treating rabies if her symptoms develop however patient is in sound medical making capability not in the fused. We discussed at length signs and symptoms of cellulitis as well as flexor tenosynovitis and when she should return to the emergency department. Patient was agreeable understanding and discharged home. DISPOSITION  Patient was discharged to home in good condition. CLINICAL IMPRESSION  1.  Cat bite of hand, initial encounter           Ny Edge PA-C  11/02/21 1010

## 2022-02-27 ENCOUNTER — HOSPITAL ENCOUNTER (EMERGENCY)
Age: 27
Discharge: HOME | End: 2022-02-28
Payer: COMMERCIAL

## 2022-02-27 VITALS
DIASTOLIC BLOOD PRESSURE: 84 MMHG | OXYGEN SATURATION: 100 % | RESPIRATION RATE: 18 BRPM | TEMPERATURE: 97.9 F | HEART RATE: 92 BPM | SYSTOLIC BLOOD PRESSURE: 126 MMHG

## 2022-02-27 DIAGNOSIS — S60.450A FOREIGN BODY OF RIGHT INDEX FINGER: Primary | ICD-10-CM

## 2022-02-27 PROCEDURE — 99283 EMERGENCY DEPT VISIT LOW MDM: CPT

## 2022-02-28 NOTE — ED PROVIDER NOTES
HS Grad    Highest education level: Not on file   Occupational History    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 Actress   Tobacco Use    Smoking status: Former Smoker     Packs/day: 1.00     Years: 2.00     Pack years: 2.00     Types: Cigarettes     Start date: 2011     Quit date: 2014     Years since quittin.6    Smokeless tobacco: Never Used   Substance and Sexual Activity    Alcohol use: Yes     Alcohol/week: 0.8 standard drinks     Types: 1 Standard drinks or equivalent per week     Comment: occasional, weekends    Drug use: No    Sexual activity: Not Currently     Partners: Male   Other Topics Concern    Not on file   Social History Narrative    Not on file     Social Determinants of Health     Financial Resource Strain:     Difficulty of Paying Living Expenses: Not on file   Food Insecurity:     Worried About Running Out of Food in the Last Year: Not on file    Lynne of Food in the Last Year: Not on file   Transportation Needs:     Lack of Transportation (Medical): Not on file    Lack of Transportation (Non-Medical):  Not on file   Physical Activity:     Days of Exercise per Week: Not on file    Minutes of Exercise per Session: Not on file   Stress:     Feeling of Stress : Not on file   Social Connections:     Frequency of Communication with Friends and Family: Not on file    Frequency of Social Gatherings with Friends and Family: Not on file    Attends Christianity Services: Not on file    Active Member of Clubs or Organizations: Not on file    Attends Club or Organization Meetings: Not on file    Marital Status: Not on file   Intimate Partner Violence:     Fear of Current or Ex-Partner: Not on file    Emotionally Abused: Not on file    Physically Abused: Not on file    Sexually Abused: Not on file   Housing Stability:     Unable to Pay for Housing in the Last Year: Not on file    Number of [place] in the Last Year: Not on file    Unstable Housing in the Last Year: Not on file     No current facility-administered medications for this encounter. Current Outpatient Medications   Medication Sig Dispense Refill    Amphetamine-Dextroamphetamine (ADDERALL PO) Take by mouth      Sertraline HCl (ZOLOFT PO) Take by mouth      butalbital-acetaminophen-caffeine (FIORICET, ESGIC) -40 MG per tablet Take 1 tablet by mouth every 6 hours as needed for Headaches 30 tablet 0    ondansetron (ZOFRAN ODT) 4 MG disintegrating tablet Take 1 tablet by mouth every 8 hours as needed for Nausea 20 tablet 0    albuterol sulfate HFA (PROAIR HFA) 108 (90 Base) MCG/ACT inhaler Use 1-2 puffs every 4 hours while awake as needed for cough/wheezing  Dispense with SPACER and Instruct on use. May sub Ventolin or Proventil as needed per [employer]. 1 Inhaler 0    ondansetron (ZOFRAN ODT) 4 MG disintegrating tablet Take 1 tablet by mouth every 8 hours as needed for Nausea or Vomiting 15 tablet 0     Allergies   Allergen Reactions    Toradol [Ketorolac Tromethamine] Itching    Dexamethasone Nausea And Vomiting    Phenergan [Promethazine] Anxiety     agitated    Zithromax [Azithromycin] Anxiety       REVIEW OF SYSTEMS:  6 systems reviewed, pertinent positives per HPI otherwise noted to be negative. PHYSICAL EXAM:  /84   Pulse 92   Temp 97.9 °F (36.6 °C) (Oral)   Resp 18   LMP 02/01/2022   SpO2 100%   CONSTITUTIONAL: Awake and alert. Well-developed. Well-nourished. Non-toxic. Cooperative. No acute distress. HENT: Normocephalic. Atraumatic. External ears normal, without discharge. Nose normal. Mucous membranes moist.  EYES: Conjunctiva non-injected. No scleral icterus. PERRL. EOM's grossly intact. NECK: Supple. Normal ROM. CARDIOVASCULAR: Normal heart rate. Intact distal pulses. PULMONARY/CHEST WALL: Breathing is unlabored. Equal, symmetric chest rise. Speaking comfortably in full sentences.    ABDOMEN: Nondistended  MUSKULOSKELETAL: Right hand: Index finger: Small, dark (and known to be metallic) foreign body seen in the superficial soft tissue of the pad of the digit. Slight tenderness to palpate. No surrounding erythema. No drainage. Normal ROM. No acute deformities. SKIN: Warm and dry. NEUROLOGICAL: Alert and oriented x 3. Strength is 5/5 in all extremities and sensation is intact. PSYCHIATRIC: Normal affect    Labs:    None    RADIOLOGY:    None      PROCEDURE:  FOREIGN BODY REMOVAL  Endy Mouraing or their surrogate had an opportunity to ask questions, and the risks, benefits, and alternatives were discussed. A local anesthetic was used to completely anesthetize the distal digit (right index finger). Using an 11 blade I was able to essentially slightly poke/penetrate the skin and left the metallic foreign body out from within the superficial soft tissue. The site was then thoroughly inspected to ensure no remaining foreign body. Wound was cleansed with alcohol and a bandage applied. There were no complications during the procedure, and the patient tolerated it well. ED COURSE/MDM:  Patient was given the following medications: None    I have evaluated this patient here in the ED. Patient is here with a small metallic foreign body in the soft tissue of her right index finger. I anesthetized the distal digit was able to remove this without complication. Patient tolerated procedure well. She is given instruction on wound care and told to watch for signs of infection. I estimate there is LOW risk for ABSCESS, CELLULITIS, COMPARTMENT SYNDROME, NECROTIZING FASCIITIS, TENDON OR NEUROVASCULAR INJURY, or RETAINED FOREIGN BODY, thus I consider the discharge disposition reasonable. Also, there is no evidence or peritonitis, sepsis, or toxicity. Endy Velásquez and I have discussed the diagnosis and risks, and we agree with discharging home to follow-up with their primary doctor.  We also discussed returning to the Emergency Department immediately if new or worsening symptoms occur. We have discussed the symptoms which are most concerning (e.g., changing or worsening pain, fever, numbness, weakness, cool or painful digits) that necessitate immediate return. CLINICAL IMPRESSION:  1. Foreign body of right index finger        Blood pressure 126/84, pulse 92, temperature 97.9 °F (36.6 °C), temperature source Oral, resp. rate 18, last menstrual period 02/01/2022, SpO2 100 %, not currently breastfeeding. PATIENT REFERRED TO:  [facility]  ED  [address] [id number] [phone]            DISPOSITION  Patient was discharged to home in good condition.           Janae Peterson  02/27/22 0018

## 2022-03-24 ENCOUNTER — HOSPITAL ENCOUNTER (EMERGENCY)
Age: 27
Discharge: HOME | End: 2022-03-24
Payer: MEDICAID

## 2022-03-24 VITALS
SYSTOLIC BLOOD PRESSURE: 107 MMHG | DIASTOLIC BLOOD PRESSURE: 60 MMHG | TEMPERATURE: 99.2 F | BODY MASS INDEX: 18.19 KG/M2 | RESPIRATION RATE: 16 BRPM | WEIGHT: 120 LBS | HEIGHT: 68 IN | HEART RATE: 78 BPM | OXYGEN SATURATION: 100 %

## 2022-03-24 DIAGNOSIS — R52 BODY ACHES: Primary | ICD-10-CM

## 2022-03-24 DIAGNOSIS — E87.1 HYPONATREMIA: ICD-10-CM

## 2022-03-24 DIAGNOSIS — R51.9 ACUTE NONINTRACTABLE HEADACHE, UNSPECIFIED HEADACHE TYPE: ICD-10-CM

## 2022-03-24 LAB
A/G RATIO: 1.6 (ref 1.1–2.2)
ALBUMIN SERPL-MCNC: 4.9 G/DL (ref 3.4–5)
ALP BLD-CCNC: 94 U/L (ref 40–129)
ALT SERPL-CCNC: 12 U/L (ref 10–40)
AMPHETAMINE SCREEN, URINE: NORMAL
ANION GAP SERPL CALCULATED.3IONS-SCNC: 11 MMOL/L (ref 3–16)
AST SERPL-CCNC: 23 U/L (ref 15–37)
BACTERIA: ABNORMAL /HPF
BARBITURATE SCREEN URINE: NORMAL
BASOPHILS ABSOLUTE: 0 K/UL (ref 0–0.2)
BASOPHILS RELATIVE PERCENT: 0.2 %
BENZODIAZEPINE SCREEN, URINE: NORMAL
BILIRUB SERPL-MCNC: 0.4 MG/DL (ref 0–1)
BILIRUBIN URINE: NEGATIVE
BLOOD, URINE: ABNORMAL
BUN BLDV-MCNC: 11 MG/DL (ref 7–20)
CALCIUM SERPL-MCNC: 9.3 MG/DL (ref 8.3–10.6)
CANNABINOID SCREEN URINE: NORMAL
CHLORIDE BLD-SCNC: 92 MMOL/L (ref 99–110)
CLARITY: CLEAR
CO2: 25 MMOL/L (ref 21–32)
COCAINE METABOLITE SCREEN URINE: NORMAL
COLOR: YELLOW
CREAT SERPL-MCNC: 0.7 MG/DL (ref 0.6–1.1)
EOSINOPHILS ABSOLUTE: 0 K/UL (ref 0–0.6)
EOSINOPHILS RELATIVE PERCENT: 0.2 %
ETHANOL: NORMAL MG/DL (ref 0–0.08)
GFR AFRICAN AMERICAN: >60
GFR NON-AFRICAN AMERICAN: >60
GLUCOSE BLD-MCNC: 102 MG/DL (ref 70–99)
GLUCOSE URINE: NEGATIVE MG/DL
HCG QUALITATIVE: NEGATIVE
HCT VFR BLD CALC: 35.7 % (ref 36–48)
HEMOGLOBIN: 12.2 G/DL (ref 12–16)
KETONES, URINE: NEGATIVE MG/DL
LACTIC ACID: 1.4 MMOL/L (ref 0.4–2)
LEUKOCYTE ESTERASE, URINE: NEGATIVE
LYMPHOCYTES ABSOLUTE: 0.5 K/UL (ref 1–5.1)
LYMPHOCYTES RELATIVE PERCENT: 3.7 %
Lab: NORMAL
MCH RBC QN AUTO: 29 PG (ref 26–34)
MCHC RBC AUTO-ENTMCNC: 34.1 G/DL (ref 31–36)
MCV RBC AUTO: 85 FL (ref 80–100)
METHADONE SCREEN, URINE: NORMAL
MICROSCOPIC EXAMINATION: YES
MONOCYTES ABSOLUTE: 0.9 K/UL (ref 0–1.3)
MONOCYTES RELATIVE PERCENT: 6.6 %
NEUTROPHILS ABSOLUTE: 12.6 K/UL (ref 1.7–7.7)
NEUTROPHILS RELATIVE PERCENT: 89.3 %
NITRITE, URINE: NEGATIVE
OPIATE SCREEN URINE: NORMAL
OXYCODONE URINE: NORMAL
PDW BLD-RTO: 15.1 % (ref 12.4–15.4)
PH UA: 6
PH UA: 6 (ref 5–8)
PHENCYCLIDINE SCREEN URINE: NORMAL
PLATELET # BLD: 258 K/UL (ref 135–450)
PMV BLD AUTO: 7.8 FL (ref 5–10.5)
POTASSIUM REFLEX MAGNESIUM: 4.3 MMOL/L (ref 3.5–5.1)
PROPOXYPHENE SCREEN: NORMAL
PROTEIN UA: NEGATIVE MG/DL
RAPID INFLUENZA  B AGN: NEGATIVE
RAPID INFLUENZA A AGN: NEGATIVE
RBC # BLD: 4.2 M/UL (ref 4–5.2)
RBC UA: ABNORMAL /HPF (ref 0–4)
SARS-COV-2, NAAT: NOT DETECTED
SODIUM BLD-SCNC: 128 MMOL/L (ref 136–145)
SPECIFIC GRAVITY UA: <=1.005 (ref 1–1.03)
TOTAL CK: 144 U/L (ref 26–192)
TOTAL PROTEIN: 7.9 G/DL (ref 6.4–8.2)
URINE TYPE: ABNORMAL
UROBILINOGEN, URINE: 0.2 E.U./DL
WBC # BLD: 14.1 K/UL (ref 4–11)
WBC UA: ABNORMAL /HPF (ref 0–5)

## 2022-03-24 PROCEDURE — 82550 ASSAY OF CK (CPK): CPT

## 2022-03-24 PROCEDURE — 80053 COMPREHEN METABOLIC PANEL: CPT

## 2022-03-24 PROCEDURE — 99284 EMERGENCY DEPT VISIT MOD MDM: CPT

## 2022-03-24 PROCEDURE — 87635 SARS-COV-2 COVID-19 AMP PRB: CPT

## 2022-03-24 PROCEDURE — 84703 CHORIONIC GONADOTROPIN ASSAY: CPT

## 2022-03-24 PROCEDURE — 6370000000 HC RX 637 (ALT 250 FOR IP): Performed by: PHYSICIAN ASSISTANT

## 2022-03-24 PROCEDURE — 82077 ASSAY SPEC XCP UR&BREATH IA: CPT

## 2022-03-24 PROCEDURE — 81001 URINALYSIS AUTO W/SCOPE: CPT

## 2022-03-24 PROCEDURE — 85025 COMPLETE CBC W/AUTO DIFF WBC: CPT

## 2022-03-24 PROCEDURE — 83605 ASSAY OF LACTIC ACID: CPT

## 2022-03-24 PROCEDURE — 80307 DRUG TEST PRSMV CHEM ANLYZR: CPT

## 2022-03-24 PROCEDURE — 87804 INFLUENZA ASSAY W/OPTIC: CPT

## 2022-03-24 PROCEDURE — 2580000003 HC RX 258: Performed by: PHYSICIAN ASSISTANT

## 2022-03-24 RX ORDER — ACETAMINOPHEN 325 MG/1
650 TABLET ORAL ONCE
Status: COMPLETED | OUTPATIENT
Start: 2022-03-24 | End: 2022-03-24

## 2022-03-24 RX ORDER — IBUPROFEN 600 MG/1
600 TABLET ORAL ONCE
Status: COMPLETED | OUTPATIENT
Start: 2022-03-24 | End: 2022-03-24

## 2022-03-24 RX ORDER — 0.9 % SODIUM CHLORIDE 0.9 %
1000 INTRAVENOUS SOLUTION INTRAVENOUS ONCE
Status: COMPLETED | OUTPATIENT
Start: 2022-03-24 | End: 2022-03-24

## 2022-03-24 RX ADMIN — ACETAMINOPHEN 650 MG: 325 TABLET ORAL at 23:21

## 2022-03-24 RX ADMIN — SODIUM CHLORIDE 1000 ML: 9 INJECTION, SOLUTION INTRAVENOUS at 21:12

## 2022-03-24 RX ADMIN — IBUPROFEN 600 MG: 600 TABLET ORAL at 21:20

## 2022-03-24 ASSESSMENT — ENCOUNTER SYMPTOMS
NAUSEA: 0
SHORTNESS OF BREATH: 0
SORE THROAT: 0
DIARRHEA: 0
EYES NEGATIVE: 1
VOMITING: 0
COLOR CHANGE: 0
ABDOMINAL PAIN: 0
COUGH: 1

## 2022-03-24 ASSESSMENT — PAIN SCALES - GENERAL
PAINLEVEL_OUTOF10: 10

## 2022-03-24 ASSESSMENT — PAIN DESCRIPTION - PAIN TYPE: TYPE: ACUTE PAIN

## 2022-03-24 ASSESSMENT — PAIN DESCRIPTION - DESCRIPTORS: DESCRIPTORS: ACHING;STABBING;THROBBING

## 2022-03-24 ASSESSMENT — PAIN - FUNCTIONAL ASSESSMENT: PAIN_FUNCTIONAL_ASSESSMENT: 0-10
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
## 2022-03-25 NOTE — ED NOTES
Patient left via personal vehicle to private residence in stable condition. Patients vitals were assessed before discharge and were stable. Patient verbalized understanding of discharge instructions, follow up and medications. RN explained information in discharge packet and patient verbalized they have no questions at this time. Patient left ER with all paperwork and belongings that RN is aware of.         Grace Nunn RN  03/24/22 [id number]
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
## 2022-03-27 ENCOUNTER — APPOINTMENT (OUTPATIENT)
Dept: CT IMAGING | Age: 27
End: 2022-03-27
Payer: MEDICAID

## 2022-03-27 ENCOUNTER — HOSPITAL ENCOUNTER (EMERGENCY)
Age: 27
Discharge: HOME | End: 2022-03-27
Attending: EMERGENCY MEDICINE
Payer: MEDICAID

## 2022-03-27 ENCOUNTER — APPOINTMENT (OUTPATIENT)
Dept: GENERAL RADIOLOGY | Age: 27
End: 2022-03-27
Payer: MEDICAID

## 2022-03-27 VITALS
RESPIRATION RATE: 16 BRPM | HEART RATE: 89 BPM | OXYGEN SATURATION: 97 % | DIASTOLIC BLOOD PRESSURE: 64 MMHG | TEMPERATURE: 97.8 F | SYSTOLIC BLOOD PRESSURE: 106 MMHG

## 2022-03-27 DIAGNOSIS — J18.9 PNEUMONIA OF LEFT LOWER LOBE DUE TO INFECTIOUS ORGANISM: Primary | ICD-10-CM

## 2022-03-27 LAB
A/G RATIO: 1.6 (ref 1.1–2.2)
ALBUMIN SERPL-MCNC: 4.4 G/DL (ref 3.4–5)
ALP BLD-CCNC: 70 U/L (ref 40–129)
ALT SERPL-CCNC: 12 U/L (ref 10–40)
ANION GAP SERPL CALCULATED.3IONS-SCNC: 14 MMOL/L (ref 3–16)
AST SERPL-CCNC: 18 U/L (ref 15–37)
BASOPHILS ABSOLUTE: 0 K/UL (ref 0–0.2)
BASOPHILS RELATIVE PERCENT: 0.4 %
BILIRUB SERPL-MCNC: 0.3 MG/DL (ref 0–1)
BILIRUBIN URINE: NEGATIVE
BLOOD, URINE: NEGATIVE
BUN BLDV-MCNC: 4 MG/DL (ref 7–20)
CALCIUM SERPL-MCNC: 9.3 MG/DL (ref 8.3–10.6)
CHLORIDE BLD-SCNC: 96 MMOL/L (ref 99–110)
CLARITY: CLEAR
CO2: 25 MMOL/L (ref 21–32)
COLOR: YELLOW
CREAT SERPL-MCNC: 0.6 MG/DL (ref 0.6–1.1)
D DIMER: 521 NG/ML DDU (ref 0–229)
EKG ATRIAL RATE: 102 BPM
EKG DIAGNOSIS: NORMAL
EKG P AXIS: 69 DEGREES
EKG P-R INTERVAL: 126 MS
EKG Q-T INTERVAL: 344 MS
EKG QRS DURATION: 82 MS
EKG QTC CALCULATION (BAZETT): 448 MS
EKG R AXIS: 63 DEGREES
EKG T AXIS: 22 DEGREES
EKG VENTRICULAR RATE: 102 BPM
EOSINOPHILS ABSOLUTE: 0 K/UL (ref 0–0.6)
EOSINOPHILS RELATIVE PERCENT: 0.1 %
GFR AFRICAN AMERICAN: >60
GFR NON-AFRICAN AMERICAN: >60
GLUCOSE BLD-MCNC: 106 MG/DL (ref 70–99)
GLUCOSE URINE: NEGATIVE MG/DL
HCG QUALITATIVE: NEGATIVE
HCT VFR BLD CALC: 35 % (ref 36–48)
HEMOGLOBIN: 11.7 G/DL (ref 12–16)
KETONES, URINE: ABNORMAL MG/DL
LACTIC ACID: 1.1 MMOL/L (ref 0.4–2)
LEUKOCYTE ESTERASE, URINE: NEGATIVE
LYMPHOCYTES ABSOLUTE: 0.3 K/UL (ref 1–5.1)
LYMPHOCYTES RELATIVE PERCENT: 3.3 %
MCH RBC QN AUTO: 29.2 PG (ref 26–34)
MCHC RBC AUTO-ENTMCNC: 33.5 G/DL (ref 31–36)
MCV RBC AUTO: 87 FL (ref 80–100)
MICROSCOPIC EXAMINATION: ABNORMAL
MONOCYTES ABSOLUTE: 1 K/UL (ref 0–1.3)
MONOCYTES RELATIVE PERCENT: 9.5 %
NEUTROPHILS ABSOLUTE: 8.7 K/UL (ref 1.7–7.7)
NEUTROPHILS RELATIVE PERCENT: 86.7 %
NITRITE, URINE: NEGATIVE
PDW BLD-RTO: 15.2 % (ref 12.4–15.4)
PH UA: 7 (ref 5–8)
PLATELET # BLD: 302 K/UL (ref 135–450)
PMV BLD AUTO: 6.9 FL (ref 5–10.5)
POTASSIUM SERPL-SCNC: 4 MMOL/L (ref 3.5–5.1)
PROTEIN UA: NEGATIVE MG/DL
RBC # BLD: 4.02 M/UL (ref 4–5.2)
SARS-COV-2, NAAT: NOT DETECTED
SODIUM BLD-SCNC: 135 MMOL/L (ref 136–145)
SPECIFIC GRAVITY UA: <=1.005 (ref 1–1.03)
TOTAL PROTEIN: 7.1 G/DL (ref 6.4–8.2)
TROPONIN: <0.01 NG/ML
URINE REFLEX TO CULTURE: ABNORMAL
URINE TYPE: ABNORMAL
UROBILINOGEN, URINE: 0.2 E.U./DL
WBC # BLD: 10 K/UL (ref 4–11)

## 2022-03-27 PROCEDURE — 87635 SARS-COV-2 COVID-19 AMP PRB: CPT

## 2022-03-27 PROCEDURE — 81003 URINALYSIS AUTO W/O SCOPE: CPT

## 2022-03-27 PROCEDURE — 71045 X-RAY EXAM CHEST 1 VIEW: CPT

## 2022-03-27 PROCEDURE — 87040 BLOOD CULTURE FOR BACTERIA: CPT

## 2022-03-27 PROCEDURE — 80053 COMPREHEN METABOLIC PANEL: CPT

## 2022-03-27 PROCEDURE — 71260 CT THORAX DX C+: CPT

## 2022-03-27 PROCEDURE — 2580000003 HC RX 258: Performed by: EMERGENCY MEDICINE

## 2022-03-27 PROCEDURE — 93010 ELECTROCARDIOGRAM REPORT: CPT | Performed by: INTERNAL MEDICINE

## 2022-03-27 PROCEDURE — 6370000000 HC RX 637 (ALT 250 FOR IP): Performed by: EMERGENCY MEDICINE

## 2022-03-27 PROCEDURE — 6360000004 HC RX CONTRAST MEDICATION: Performed by: EMERGENCY MEDICINE

## 2022-03-27 PROCEDURE — 99285 EMERGENCY DEPT VISIT HI MDM: CPT

## 2022-03-27 PROCEDURE — 85379 FIBRIN DEGRADATION QUANT: CPT

## 2022-03-27 PROCEDURE — 85025 COMPLETE CBC W/AUTO DIFF WBC: CPT

## 2022-03-27 PROCEDURE — 96365 THER/PROPH/DIAG IV INF INIT: CPT

## 2022-03-27 PROCEDURE — 84703 CHORIONIC GONADOTROPIN ASSAY: CPT

## 2022-03-27 PROCEDURE — 84484 ASSAY OF TROPONIN QUANT: CPT

## 2022-03-27 PROCEDURE — 6360000002 HC RX W HCPCS: Performed by: EMERGENCY MEDICINE

## 2022-03-27 PROCEDURE — 83605 ASSAY OF LACTIC ACID: CPT

## 2022-03-27 PROCEDURE — 93005 ELECTROCARDIOGRAM TRACING: CPT | Performed by: EMERGENCY MEDICINE

## 2022-03-27 PROCEDURE — 96366 THER/PROPH/DIAG IV INF ADDON: CPT

## 2022-03-27 RX ORDER — LIDOCAINE 4 G/G
1 PATCH TOPICAL ONCE
Status: DISCONTINUED | OUTPATIENT
Start: 2022-03-27 | End: 2022-03-27 | Stop reason: HOSPADM

## 2022-03-27 RX ORDER — LIDOCAINE 4 G/G
1 PATCH TOPICAL DAILY
Status: DISCONTINUED | OUTPATIENT
Start: 2022-03-27 | End: 2022-03-27

## 2022-03-27 RX ORDER — IBUPROFEN 600 MG/1
600 TABLET ORAL ONCE
Status: COMPLETED | OUTPATIENT
Start: 2022-03-27 | End: 2022-03-27

## 2022-03-27 RX ORDER — LEVOFLOXACIN 500 MG/1
500 TABLET, FILM COATED ORAL DAILY
Qty: 10 TABLET | Refills: 0 | Status: SHIPPED | OUTPATIENT
Start: 2022-03-27 | End: 2022-04-06

## 2022-03-27 RX ORDER — 0.9 % SODIUM CHLORIDE 0.9 %
30 INTRAVENOUS SOLUTION INTRAVENOUS ONCE
Status: COMPLETED | OUTPATIENT
Start: 2022-03-27 | End: 2022-03-27

## 2022-03-27 RX ORDER — LEVOFLOXACIN 5 MG/ML
750 INJECTION, SOLUTION INTRAVENOUS ONCE
Status: COMPLETED | OUTPATIENT
Start: 2022-03-27 | End: 2022-03-27

## 2022-03-27 RX ORDER — ACETAMINOPHEN 500 MG
1000 TABLET ORAL ONCE
Status: COMPLETED | OUTPATIENT
Start: 2022-03-27 | End: 2022-03-27

## 2022-03-27 RX ADMIN — ACETAMINOPHEN 1000 MG: 500 TABLET ORAL at 03:33

## 2022-03-27 RX ADMIN — LEVOFLOXACIN 750 MG: 5 INJECTION, SOLUTION INTRAVENOUS at 04:47

## 2022-03-27 RX ADMIN — IBUPROFEN 600 MG: 600 TABLET ORAL at 04:45

## 2022-03-27 RX ADMIN — SODIUM CHLORIDE 1632 ML: 9 INJECTION, SOLUTION INTRAVENOUS at 02:59

## 2022-03-27 RX ADMIN — IOPAMIDOL 75 ML: 755 INJECTION, SOLUTION INTRAVENOUS at 03:54

## 2022-03-27 ASSESSMENT — PAIN DESCRIPTION - DESCRIPTORS: DESCRIPTORS: STABBING

## 2022-03-27 ASSESSMENT — PAIN SCALES - GENERAL
PAINLEVEL_OUTOF10: 10

## 2022-03-27 ASSESSMENT — ENCOUNTER SYMPTOMS
VOMITING: 0
FACIAL SWELLING: 0
COLOR CHANGE: 0
TROUBLE SWALLOWING: 0
SHORTNESS OF BREATH: 1
ABDOMINAL PAIN: 0
VOICE CHANGE: 0
NAUSEA: 0
STRIDOR: 0
BACK PAIN: 1
WHEEZING: 0
COUGH: 1

## 2022-03-27 ASSESSMENT — PAIN DESCRIPTION - PAIN TYPE: TYPE: ACUTE PAIN

## 2022-03-27 ASSESSMENT — PAIN DESCRIPTION - PROGRESSION: CLINICAL_PROGRESSION: NOT CHANGED

## 2022-03-27 ASSESSMENT — PAIN DESCRIPTION - ORIENTATION
ORIENTATION: MID
ORIENTATION: MID

## 2022-03-27 ASSESSMENT — PAIN DESCRIPTION - FREQUENCY: FREQUENCY: CONTINUOUS

## 2022-03-27 ASSESSMENT — PAIN DESCRIPTION - ONSET: ONSET: ON-GOING

## 2022-03-27 ASSESSMENT — PAIN DESCRIPTION - LOCATION
LOCATION: BACK
LOCATION: CHEST

## 2022-03-27 NOTE — ED NOTES
Pt reports that her back pain is improved with application of lidocaine patch but now chest hurts 10/10     Alex Franks RN  03/27/22 [id number]
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
## 2022-03-31 LAB
BLOOD CULTURE, ROUTINE: NORMAL
CULTURE, BLOOD 2: NORMAL

## 2022-06-25 ENCOUNTER — HOSPITAL ENCOUNTER (EMERGENCY)
Age: 27
Discharge: HOME | End: 2022-06-25
Attending: EMERGENCY MEDICINE
Payer: COMMERCIAL

## 2022-06-25 ENCOUNTER — APPOINTMENT (OUTPATIENT)
Dept: GENERAL RADIOLOGY | Age: 27
End: 2022-06-25
Payer: COMMERCIAL

## 2022-06-25 ENCOUNTER — APPOINTMENT (OUTPATIENT)
Dept: CT IMAGING | Age: 27
End: 2022-06-25
Payer: COMMERCIAL

## 2022-06-25 VITALS
TEMPERATURE: 98.1 F | HEART RATE: 56 BPM | WEIGHT: 125 LBS | BODY MASS INDEX: 18.94 KG/M2 | OXYGEN SATURATION: 100 % | DIASTOLIC BLOOD PRESSURE: 86 MMHG | SYSTOLIC BLOOD PRESSURE: 121 MMHG | RESPIRATION RATE: 18 BRPM | HEIGHT: 68 IN

## 2022-06-25 DIAGNOSIS — R51.9 NONINTRACTABLE HEADACHE, UNSPECIFIED CHRONICITY PATTERN, UNSPECIFIED HEADACHE TYPE: ICD-10-CM
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
## 2022-06-25 PROCEDURE — 73010 X-RAY EXAM OF SHOULDER BLADE: CPT

## 2022-06-25 PROCEDURE — 73130 X-RAY EXAM OF HAND: CPT

## 2022-06-25 PROCEDURE — 99284 EMERGENCY DEPT VISIT MOD MDM: CPT

## 2022-06-25 PROCEDURE — 70450 CT HEAD/BRAIN W/O DYE: CPT

## 2022-06-25 PROCEDURE — 6370000000 HC RX 637 (ALT 250 FOR IP): Performed by: EMERGENCY MEDICINE

## 2022-06-25 RX ORDER — IBUPROFEN 400 MG/1
400 TABLET ORAL ONCE
Status: COMPLETED | OUTPATIENT
Start: 2022-06-25 | End: 2022-06-25

## 2022-06-25 RX ADMIN — IBUPROFEN 400 MG: 400 TABLET, FILM COATED ORAL at 04:34

## 2022-06-25 ASSESSMENT — PAIN SCALES - GENERAL
PAINLEVEL_OUTOF10: 3
PAINLEVEL_OUTOF10: 6

## 2022-06-25 ASSESSMENT — PAIN DESCRIPTION - FREQUENCY: FREQUENCY: OTHER (COMMENT)

## 2022-06-25 ASSESSMENT — PAIN - FUNCTIONAL ASSESSMENT
PAIN_FUNCTIONAL_ASSESSMENT: 0-10
PAIN_FUNCTIONAL_ASSESSMENT: 0-10

## 2022-06-25 ASSESSMENT — PAIN DESCRIPTION - LOCATION
LOCATION: HEAD
LOCATION: BACK;SHOULDER;HAND

## 2022-06-25 ASSESSMENT — PAIN DESCRIPTION - DESCRIPTORS: DESCRIPTORS: ACHING

## 2022-06-25 ASSESSMENT — PAIN DESCRIPTION - ORIENTATION: ORIENTATION: LEFT

## 2022-06-25 ASSESSMENT — PAIN DESCRIPTION - PAIN TYPE: TYPE: ACUTE PAIN
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
## 2022-08-13 ENCOUNTER — HOSPITAL ENCOUNTER (EMERGENCY)
Age: 27
Discharge: HOME | End: 2022-08-13
Attending: EMERGENCY MEDICINE
Payer: COMMERCIAL

## 2022-08-13 VITALS
TEMPERATURE: 97 F | SYSTOLIC BLOOD PRESSURE: 108 MMHG | HEIGHT: 68 IN | HEART RATE: 70 BPM | WEIGHT: 125 LBS | BODY MASS INDEX: 18.94 KG/M2 | DIASTOLIC BLOOD PRESSURE: 70 MMHG | OXYGEN SATURATION: 100 % | RESPIRATION RATE: 16 BRPM

## 2022-08-13 DIAGNOSIS — K08.89 DENTALGIA: Primary | ICD-10-CM

## 2022-08-13 LAB
A/G RATIO: 1.8 (ref 1.1–2.2)
ALBUMIN SERPL-MCNC: 4.6 G/DL (ref 3.4–5)
ALP BLD-CCNC: 66 U/L (ref 40–129)
ALT SERPL-CCNC: 12 U/L (ref 10–40)
ANION GAP SERPL CALCULATED.3IONS-SCNC: 11 MMOL/L (ref 3–16)
AST SERPL-CCNC: 24 U/L (ref 15–37)
BASOPHILS ABSOLUTE: 0 K/UL (ref 0–0.2)
BASOPHILS RELATIVE PERCENT: 0.8 %
BILIRUB SERPL-MCNC: 0.3 MG/DL (ref 0–1)
BILIRUBIN URINE: NEGATIVE
BLOOD, URINE: NEGATIVE
BUN BLDV-MCNC: 10 MG/DL (ref 7–20)
CALCIUM SERPL-MCNC: 9.6 MG/DL (ref 8.3–10.6)
CHLORIDE BLD-SCNC: 100 MMOL/L (ref 99–110)
CLARITY: CLEAR
CO2: 24 MMOL/L (ref 21–32)
COLOR: YELLOW
CREAT SERPL-MCNC: <0.5 MG/DL (ref 0.6–1.1)
EOSINOPHILS ABSOLUTE: 0.1 K/UL (ref 0–0.6)
EOSINOPHILS RELATIVE PERCENT: 1.8 %
EPITHELIAL CELLS, UA: ABNORMAL /HPF (ref 0–5)
GFR AFRICAN AMERICAN: >60
GFR NON-AFRICAN AMERICAN: >60
GLUCOSE BLD-MCNC: 92 MG/DL (ref 70–99)
GLUCOSE URINE: NEGATIVE MG/DL
HCG(URINE) PREGNANCY TEST: NEGATIVE
HCT VFR BLD CALC: 31.8 % (ref 36–48)
HEMOGLOBIN: 10.6 G/DL (ref 12–16)
KETONES, URINE: NEGATIVE MG/DL
LACTIC ACID, SEPSIS: 0.7 MMOL/L (ref 0.4–1.9)
LEUKOCYTE ESTERASE, URINE: NEGATIVE
LYMPHOCYTES ABSOLUTE: 1.5 K/UL (ref 1–5.1)
LYMPHOCYTES RELATIVE PERCENT: 32.4 %
MCH RBC QN AUTO: 28.2 PG (ref 26–34)
MCHC RBC AUTO-ENTMCNC: 33.2 G/DL (ref 31–36)
MCV RBC AUTO: 84.8 FL (ref 80–100)
MICROSCOPIC EXAMINATION: YES
MONOCYTES ABSOLUTE: 0.4 K/UL (ref 0–1.3)
MONOCYTES RELATIVE PERCENT: 8.9 %
MUCUS: ABNORMAL /LPF
NEUTROPHILS ABSOLUTE: 2.6 K/UL (ref 1.7–7.7)
NEUTROPHILS RELATIVE PERCENT: 56.1 %
NITRITE, URINE: NEGATIVE
PDW BLD-RTO: 15.5 % (ref 12.4–15.4)
PH UA: 6 (ref 5–8)
PLATELET # BLD: 271 K/UL (ref 135–450)
PMV BLD AUTO: 7.1 FL (ref 5–10.5)
POTASSIUM SERPL-SCNC: 3.7 MMOL/L (ref 3.5–5.1)
PROCALCITONIN: 0.02 NG/ML (ref 0–0.15)
PROTEIN UA: ABNORMAL MG/DL
RBC # BLD: 3.75 M/UL (ref 4–5.2)
RBC UA: ABNORMAL /HPF (ref 0–4)
SODIUM BLD-SCNC: 135 MMOL/L (ref 136–145)
SPECIFIC GRAVITY UA: 1.02 (ref 1–1.03)
SPECIMEN STATUS: NORMAL
TOTAL PROTEIN: 7.2 G/DL (ref 6.4–8.2)
URINE TYPE: ABNORMAL
UROBILINOGEN, URINE: 0.2 E.U./DL
WBC # BLD: 4.7 K/UL (ref 4–11)
WBC UA: ABNORMAL /HPF (ref 0–5)

## 2022-08-13 PROCEDURE — 99283 EMERGENCY DEPT VISIT LOW MDM: CPT

## 2022-08-13 PROCEDURE — 80053 COMPREHEN METABOLIC PANEL: CPT

## 2022-08-13 PROCEDURE — 85025 COMPLETE CBC W/AUTO DIFF WBC: CPT

## 2022-08-13 PROCEDURE — 83605 ASSAY OF LACTIC ACID: CPT

## 2022-08-13 PROCEDURE — 87040 BLOOD CULTURE FOR BACTERIA: CPT

## 2022-08-13 PROCEDURE — 81001 URINALYSIS AUTO W/SCOPE: CPT

## 2022-08-13 PROCEDURE — 84703 CHORIONIC GONADOTROPIN ASSAY: CPT

## 2022-08-13 PROCEDURE — 84145 PROCALCITONIN (PCT): CPT

## 2022-08-13 RX ORDER — PENICILLIN V POTASSIUM 500 MG/1
500 TABLET ORAL 4 TIMES DAILY
Qty: 40 TABLET | Refills: 0 | Status: SHIPPED | OUTPATIENT
Start: 2022-08-13 | End: 2022-08-23

## 2022-08-13 RX ORDER — IBUPROFEN 600 MG/1
600 TABLET ORAL EVERY 6 HOURS PRN
Qty: 40 TABLET | Refills: 0 | Status: SHIPPED | OUTPATIENT
Start: 2022-08-13

## 2022-08-13 ASSESSMENT — PAIN SCALES - GENERAL: PAINLEVEL_OUTOF10: 8

## 2022-08-13 ASSESSMENT — PAIN - FUNCTIONAL ASSESSMENT: PAIN_FUNCTIONAL_ASSESSMENT: 0-10

## 2022-08-13 NOTE — DISCHARGE INSTRUCTIONS
Dental Emergency Referrals    [address] [facility] residents only)    [facility] AT [place]  [address] [phone]   [facility].  [phone]    [facility]  [address]  [phone]   [facility]  (entrance on [address] [facility]. [address] [place] [address].)  [address]  [phone]   [facility] [address].  [phone]   [facility]  [address] [place] [address].  [phone] [address].  [phone]   [facility]  [address]  [phone]     [facility] MEDICAL [place]  [address] 2nd floor  [phone]   [facility]  [address] [phone] ([address] [facility])  [place]: [address]: [address]  [phone]   [address] [facility] [address]  [facility]  [phone]     [facility]  [address]  [phone]   [address]  [address] [facility].  Oral Surgery Dept: [phone]  Dental Clinic: [address] [facility]  [phone]     [address] [phone]   [facility]   [address] [place], [address]  [place] : [address] : [phone]     [facility]  [address]    Other [address] in the area    [address] [facility] [place] (Dental Urgent Care)  [place] [employer]   (Across from [place])  [place], [address]  [phone]?       Pediatric Only Dentists    [address]Third Floor   Up to age 21  [address]  Up to age 24  [place]: [address]   [phone] or [address] [facility] Prudencio: [address] [place] [address]  Up to age 14  [address] [phone]

## 2022-08-13 NOTE — ED PROVIDER NOTES
CHIEF COMPLAINT  Dental Pain (Pt states all her bottom teeth are loose and both sides of her face hurt and now her neck and shoulders are hurting. States this has been starting over the last couple of months. Has seen PCP Thursday. States she is febrile every day for months not febrile here.  )      HISTORY OF PRESENT ILLNESS  Rajwinder De La Torre is a 28 y.o. female with a history of WCSGE-42, cyclical vomiting, depression, who presents to the ED complaining of dental pain. Patient reports that over the last 24 hours she began noting dental discomfort along the lower anterior jawline. Patient states that when she went to fall she noticed that one of her teeth were moving. Patient denies trauma or injury. She states that she has had a fever almost daily for the last month. She states that she was seen and evaluated by PCP and had lab work completed within the last week. .   No other complaints, modifying factors or associated symptoms. I have reviewed the following from the nursing documentation. Past Medical History:   Diagnosis Date    COVID-19     Cyclical vomiting     Depression age 11    Anxiety    History of chicken pox 1996     History reviewed. No pertinent surgical history.   Family History   Problem Relation Age of Onset    Cancer Mother 28        Melanoma    High Cholesterol Mother     Thyroid Disease Mother         Thyroid Removed, Hypothyroid    Depression Mother     Alcohol Abuse Father     Colon Cancer Maternal Grandmother 61         age 64 of colon cancer    Stroke Maternal Grandmother     Asthma Maternal Grandmother     Heart Disease Maternal Grandfather 61        MI    High Blood Pressure Maternal Grandfather      Social History     Socioeconomic History    Marital status: Single     Spouse name: Not on file    Number of children: 0    Years of education: HS Grad    Highest education level: Not on file   Occupational History    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 Actress   Tobacco Use    Smoking status: Former     Packs/day: 1.00     Years: 2.00     Pack years: 2.00     Types: Cigarettes     Start date: 2011     Quit date: 2014     Years since quittin.1    Smokeless tobacco: Never   Substance and Sexual Activity    Alcohol use: Yes     Alcohol/week: 0.8 standard drinks     Types: 1 Standard drinks or equivalent per week     Comment: occasional, weekends    Drug use: No    Sexual activity: Not Currently     Partners: Male   Other Topics Concern    Not on file   Social History Narrative    Not on file     Social Determinants of Health     Financial Resource Strain: Not on file   Food Insecurity: Not on file   Transportation Needs: Not on file   Physical Activity: Not on file   Stress: Not on file   Social Connections: Not on file   Intimate Partner Violence: Not on file   Housing Stability: Not on file     No current facility-administered medications for this encounter. Current Outpatient Medications   Medication Sig Dispense Refill    Amphetamine-Dextroamphetamine (ADDERALL PO) Take by mouth      Sertraline HCl (ZOLOFT PO) Take by mouth      butalbital-acetaminophen-caffeine (FIORICET, ESGIC) -40 MG per tablet Take 1 tablet by mouth every 6 hours as needed for Headaches 30 tablet 0    ondansetron (ZOFRAN ODT) 4 MG disintegrating tablet Take 1 tablet by mouth every 8 hours as needed for Nausea 20 tablet 0    albuterol sulfate HFA (PROAIR HFA) 108 (90 Base) MCG/ACT inhaler Use 1-2 puffs every 4 hours while awake as needed for cough/wheezing  Dispense with SPACER and Instruct on use. May sub Ventolin or Proventil as needed per [employer].  1 Inhaler 0    ondansetron (ZOFRAN ODT) 4 MG disintegrating tablet Take 1 tablet by mouth every 8 hours as needed for Nausea or Vomiting 15 tablet 0     Allergies   Allergen Reactions    Toradol [Ketorolac Tromethamine] Itching    Dexamethasone Nausea And Vomiting    Phenergan [Promethazine] Anxiety     agitated Zithromax [Azithromycin] Anxiety       REVIEW OF SYSTEMS  10 systems reviewed, pertinent positives per HPI otherwise noted to be negative. PHYSICAL EXAM  /72   Pulse 71   Temp (!) 96.5 °F (35.8 °C) (Oral)   Resp 16   Ht 5' 8\" (1.727 m)   Wt 125 lb (56.7 kg)   LMP 08/09/2022   SpO2 100%   BMI 19.01 kg/m²   GENERAL APPEARANCE: Awake and alert. Cooperative. No acute distress. HEAD: Normocephalic. Atraumatic. EYES: PERRL. EOM's grossly intact. ENT: Mucous membranes are moist.  Midface is stable. Posterior oropharynx within normal limits. No erythema or exudate. Patient does have slightly mobile teeth specifically on the right lower incisors. No obvious swelling or signs of erythema/NUG. NECK: Supple, trachea midline. Anterior chain: Mild  adenopathy  HEART: RRR. Normal S1, S2. No murmurs, rubs or gallops. LUNGS: Respirations unlabored. CTAB. Good air exchange. No wheezes, rales, or rhonchi. Speaking comfortably in full sentences. ABDOMEN: Soft. Non-distended. Non-tender. No guarding or rebound. Normal Bowel sounds. EXTREMITIES: No peripheral edema. MAEE. No acute deformities. SKIN: Warm and dry. No acute rashes. NEUROLOGICAL: Alert and oriented X 3. CN II-XII intact. No gross facial drooping. Strength 5/5, sensation intact. No pronator drift. Normal coordination. Gait normal.   PSYCHIATRIC: Normal mood and affect. LABS  I have reviewed all labs for this visit.    Results for orders placed or performed during the hospital encounter of 08/13/22   CBC with Auto Differential   Result Value Ref Range    WBC 4.7 4.0 - 11.0 K/uL    RBC 3.75 (L) 4.00 - 5.20 M/uL    Hemoglobin 10.6 (L) 12.0 - 16.0 g/dL    Hematocrit 31.8 (L) 36.0 - 48.0 %    MCV 84.8 80.0 - 100.0 fL    MCH 28.2 26.0 - 34.0 pg    MCHC 33.2 31.0 - 36.0 g/dL    RDW 15.5 (H) 12.4 - 15.4 %    Platelets 630 770 - 447 K/uL    MPV 7.1 5.0 - 10.5 fL    Neutrophils % 56.1 %    Lymphocytes % 32.4 %    Monocytes % 8.9 % Eosinophils % 1.8 %    Basophils % 0.8 %    Neutrophils Absolute 2.6 1.7 - 7.7 K/uL    Lymphocytes Absolute 1.5 1.0 - 5.1 K/uL    Monocytes Absolute 0.4 0.0 - 1.3 K/uL    Eosinophils Absolute 0.1 0.0 - 0.6 K/uL    Basophils Absolute 0.0 0.0 - 0.2 K/uL   Lactate, Sepsis   Result Value Ref Range    Lactic Acid, Sepsis 0.7 0.4 - 1.9 mmol/L   Urinalysis   Result Value Ref Range    Color, UA Yellow Straw/Yellow    Clarity, UA Clear Clear    Glucose, Ur Negative Negative mg/dL    Bilirubin Urine Negative Negative    Ketones, Urine Negative Negative mg/dL    Specific Gravity, UA 1.025 1.005 - 1.030    Blood, Urine Negative Negative    pH, UA 6.0 5.0 - 8.0    Protein, UA TRACE (A) Negative mg/dL    Urobilinogen, Urine 0.2 <2.0 E.U./dL    Nitrite, Urine Negative Negative    Leukocyte Esterase, Urine Negative Negative    Microscopic Examination YES     Urine Type NotGiven    Pregnancy, Urine   Result Value Ref Range    HCG(Urine) Pregnancy Test Negative Detects HCG level >20 MIU/mL   Sample possible blood bank testing   Result Value Ref Range    Specimen Status ANTONIO                     Rechecks: Physical assessment performed. 741: Temp 97 °F.  Pulse stable. Critical Care:0 min    Is this patient to be included in the SEP-1 Core Measure? No   Exclusion criteria - the patient is NOT to be included for SEP-1 Core Measure due to:  Viral etiology found or highly suspected (including COVID-19) without concomitant bacterial infection                ED COURSE/MDM  Patient seen and evaluated. Old records reviewed. Labs and imaging reviewed and results discussed with patient. Patient was stable throughout her stay in the emergency department. I reviewed patient's recent outpatient labs which were significant for elevated EBV antibodies. Outpatient labs were otherwise within normal limits. Patient's white blood cell count is within normal limits. Procalcitonin suggest low likelihood of sepsis/infection.   Patient was reassessed as noted above . Per discussion, I do not see any acute dental pathology at this time in spite of mildly loose anterior lower teeth. Given patient's history of reported fevers, I will initiate antibiotics. I further recommended close outpatient follow-up with PCP to evaluate for additional pathology and potential imaging for patient's recurrent fevers. No acute pathology was noted and pt is safe for discharge home to follow up with PCP. Pinky Angles Plan of care discussed with patient and family. Patient and family in agreement with plan. Patient was given scripts for the following medications. I counseled patient how to take these medications. New Prescriptions    No medications on file       CLINICAL IMPRESSION  1. Dentalgia        Blood pressure 120/72, pulse 71, temperature (!) 96.5 °F (35.8 °C), temperature source Oral, resp. rate 16, height 5' 8\" (1.727 m), weight 125 lb (56.7 kg), last menstrual period 08/09/2022, SpO2 100 %, not currently breastfeeding. DISPOSITION  Wanda Quezada was discharged to home in stable condition.         Rita Bishop DO  08/13/22 [id number]

## 2022-08-17 LAB
BLOOD CULTURE, ROUTINE: NORMAL
CULTURE, BLOOD 2: NORMAL

## 2024-04-17 ENCOUNTER — APPOINTMENT (OUTPATIENT)
Dept: GENERAL RADIOLOGY | Age: 29
End: 2024-04-17
Payer: MEDICAID

## 2024-04-17 ENCOUNTER — HOSPITAL ENCOUNTER (OUTPATIENT)
Age: 29
Setting detail: OBSERVATION
Discharge: HOME | End: 2024-04-19
Attending: INTERNAL MEDICINE | Admitting: INTERNAL MEDICINE
Payer: MEDICAID

## 2024-04-17 DIAGNOSIS — R06.02 SHORTNESS OF BREATH: Primary | ICD-10-CM

## 2024-04-17 DIAGNOSIS — R09.02 HYPOXEMIA: ICD-10-CM

## 2024-04-17 DIAGNOSIS — R07.9 CHEST PAIN, UNSPECIFIED TYPE: ICD-10-CM

## 2024-04-17 PROCEDURE — 93005 ELECTROCARDIOGRAM TRACING: CPT | Performed by: INTERNAL MEDICINE

## 2024-04-17 PROCEDURE — 99285 EMERGENCY DEPT VISIT HI MDM: CPT

## 2024-04-17 RX ORDER — ACETAMINOPHEN 325 MG/1
650 TABLET ORAL ONCE
Status: COMPLETED | OUTPATIENT
Start: 2024-04-18 | End: 2024-04-18

## 2024-04-17 RX ORDER — IBUPROFEN 600 MG/1
600 TABLET ORAL ONCE
Status: COMPLETED | OUTPATIENT
Start: 2024-04-18 | End: 2024-04-18

## 2024-04-17 ASSESSMENT — PAIN DESCRIPTION - LOCATION: LOCATION: OTHER (COMMENT)

## 2024-04-17 ASSESSMENT — PAIN SCALES - GENERAL: PAINLEVEL_OUTOF10: 8

## 2024-04-17 ASSESSMENT — LIFESTYLE VARIABLES
HOW OFTEN DO YOU HAVE A DRINK CONTAINING ALCOHOL: MONTHLY OR LESS
HOW MANY STANDARD DRINKS CONTAINING ALCOHOL DO YOU HAVE ON A TYPICAL DAY: 1 OR 2

## 2024-04-17 ASSESSMENT — PAIN - FUNCTIONAL ASSESSMENT: PAIN_FUNCTIONAL_ASSESSMENT: 0-10

## 2024-04-17 ASSESSMENT — PAIN DESCRIPTION - DESCRIPTORS: DESCRIPTORS: ACHING

## 2024-04-18 ENCOUNTER — APPOINTMENT (OUTPATIENT)
Dept: GENERAL RADIOLOGY | Age: 29
End: 2024-04-18
Payer: MEDICAID

## 2024-04-18 ENCOUNTER — APPOINTMENT (OUTPATIENT)
Dept: CT IMAGING | Age: 29
End: 2024-04-18
Attending: STUDENT IN AN ORGANIZED HEALTH CARE EDUCATION/TRAINING PROGRAM
Payer: MEDICAID

## 2024-04-18 PROBLEM — R09.02 HYPOXIA: Status: ACTIVE | Noted: 2024-04-18

## 2024-04-18 LAB
ALBUMIN SERPL-MCNC: 4.3 G/DL (ref 3.4–5)
ALBUMIN/GLOB SERPL: 1.7 {RATIO} (ref 1.1–2.2)
ALP SERPL-CCNC: 75 U/L (ref 40–129)
ALT SERPL-CCNC: <5 U/L (ref 10–40)
ANION GAP SERPL CALCULATED.3IONS-SCNC: 10 MMOL/L (ref 3–16)
AST SERPL-CCNC: <5 U/L (ref 15–37)
BASE EXCESS BLDV CALC-SCNC: -1.2 MMOL/L
BASE EXCESS BLDV CALC-SCNC: 0.8 MMOL/L
BASOPHILS # BLD: 0.3 K/UL (ref 0–0.2)
BASOPHILS NFR BLD: 5.3 %
BILIRUB SERPL-MCNC: <0.2 MG/DL (ref 0–1)
BILIRUB UR QL STRIP.AUTO: NEGATIVE
BUN SERPL-MCNC: 6 MG/DL (ref 7–20)
CALCIUM SERPL-MCNC: 8.5 MG/DL (ref 8.3–10.6)
CHLORIDE SERPL-SCNC: 103 MMOL/L (ref 99–110)
CLARITY UR: CLEAR
CO2 BLDV-SCNC: 26 MMOL/L
CO2 BLDV-SCNC: 29 MMOL/L
CO2 SERPL-SCNC: 26 MMOL/L (ref 21–32)
COHGB MFR BLDV: 1 %
COHGB MFR BLDV: 1.2 %
COLOR UR: YELLOW
CREAT SERPL-MCNC: <0.5 MG/DL (ref 0.6–1.1)
D DIMER: 0.42 UG/ML FEU (ref 0–0.6)
DEPRECATED RDW RBC AUTO: 15 % (ref 12.4–15.4)
EKG ATRIAL RATE: 65 BPM
EKG DIAGNOSIS: NORMAL
EKG P AXIS: 32 DEGREES
EKG P-R INTERVAL: 126 MS
EKG Q-T INTERVAL: 400 MS
EKG QRS DURATION: 78 MS
EKG QTC CALCULATION (BAZETT): 416 MS
EKG R AXIS: 29 DEGREES
EKG T AXIS: 15 DEGREES
EKG VENTRICULAR RATE: 65 BPM
EOSINOPHIL # BLD: 0.3 K/UL (ref 0–0.6)
EOSINOPHIL NFR BLD: 4.3 %
FLUAV RNA UPPER RESP QL NAA+PROBE: NEGATIVE
FLUBV AG NPH QL: NEGATIVE
GFR SERPLBLD CREATININE-BSD FMLA CKD-EPI: >90 ML/MIN/{1.73_M2}
GLUCOSE SERPL-MCNC: 99 MG/DL (ref 70–99)
GLUCOSE UR STRIP.AUTO-MCNC: NEGATIVE MG/DL
HCG SERPL QL: NEGATIVE
HCO3 BLDV-SCNC: 24 MMOL/L (ref 23–29)
HCO3 BLDV-SCNC: 28 MMOL/L (ref 23–29)
HCT VFR BLD AUTO: 37.2 % (ref 36–48)
HGB BLD-MCNC: 13.4 G/DL (ref 12–16)
HGB UR QL STRIP.AUTO: NEGATIVE
KETONES UR STRIP.AUTO-MCNC: NEGATIVE MG/DL
LEUKOCYTE ESTERASE UR QL STRIP.AUTO: NEGATIVE
LYMPHOCYTES # BLD: 1.1 K/UL (ref 1–5.1)
LYMPHOCYTES NFR BLD: 18.3 %
MCH RBC QN AUTO: 32.9 PG (ref 26–34)
MCHC RBC AUTO-ENTMCNC: 36 G/DL (ref 31–36)
MCV RBC AUTO: 91.5 FL (ref 80–100)
METHGB MFR BLDV: 0.5 %
METHGB MFR BLDV: 0.5 %
MONOCYTES # BLD: 0.6 K/UL (ref 0–1.3)
MONOCYTES NFR BLD: 9.4 %
NEUTROPHILS # BLD: 3.9 K/UL (ref 1.7–7.7)
NEUTROPHILS NFR BLD: 62.7 %
NITRITE UR QL STRIP.AUTO: NEGATIVE
NT-PROBNP SERPL-MCNC: <36 PG/ML (ref 0–124)
O2 THERAPY: ABNORMAL
O2 THERAPY: NORMAL
ORGANISM: ABNORMAL
PCO2 BLDV: 43.4 MMHG (ref 40–50)
PCO2 BLDV: 53.3 MMHG (ref 40–50)
PH BLDV: 7.32 [PH] (ref 7.35–7.45)
PH BLDV: 7.36 [PH] (ref 7.35–7.45)
PH UR STRIP.AUTO: 7.5 [PH] (ref 5–8)
PLATELET # BLD AUTO: 254 K/UL (ref 135–450)
PMV BLD AUTO: 8.6 FL (ref 5–10.5)
PO2 BLDV: 45 MMHG
PO2 BLDV: <30 MMHG
POTASSIUM SERPL-SCNC: 4.1 MMOL/L (ref 3.5–5.1)
PROCALCITONIN SERPL IA-MCNC: 0.03 NG/ML (ref 0–0.15)
PROT SERPL-MCNC: 6.9 G/DL (ref 6.4–8.2)
PROT UR STRIP.AUTO-MCNC: NEGATIVE MG/DL
RBC # BLD AUTO: 4.06 M/UL (ref 4–5.2)
REPORT: NORMAL
RESP PATH DNA+RNA PNL NPH NAA+NON-PROBE: ABNORMAL
SAO2 % BLDV: 44 %
SAO2 % BLDV: 80 %
SARS-COV-2 RDRP RESP QL NAA+PROBE: NOT DETECTED
SODIUM SERPL-SCNC: 139 MMOL/L (ref 136–145)
SP GR UR STRIP.AUTO: 1.01 (ref 1–1.03)
TROPONIN, HIGH SENSITIVITY: <6 NG/L (ref 0–14)
UA COMPLETE W REFLEX CULTURE PNL UR: NORMAL
UA DIPSTICK W REFLEX MICRO PNL UR: NORMAL
URN SPEC COLLECT METH UR: NORMAL
UROBILINOGEN UR STRIP-ACNC: 0.2 E.U./DL
WBC # BLD AUTO: 6.2 K/UL (ref 4–11)

## 2024-04-18 PROCEDURE — 6370000000 HC RX 637 (ALT 250 FOR IP): Performed by: PHYSICIAN ASSISTANT

## 2024-04-18 PROCEDURE — 71250 CT THORAX DX C-: CPT

## 2024-04-18 PROCEDURE — 6370000000 HC RX 637 (ALT 250 FOR IP): Performed by: INTERNAL MEDICINE

## 2024-04-18 PROCEDURE — 71046 X-RAY EXAM CHEST 2 VIEWS: CPT

## 2024-04-18 PROCEDURE — 80053 COMPREHEN METABOLIC PANEL: CPT

## 2024-04-18 PROCEDURE — 2580000003 HC RX 258: Performed by: INTERNAL MEDICINE

## 2024-04-18 PROCEDURE — 36415 COLL VENOUS BLD VENIPUNCTURE: CPT

## 2024-04-18 PROCEDURE — 87635 SARS-COV-2 COVID-19 AMP PRB: CPT

## 2024-04-18 PROCEDURE — G0378 HOSPITAL OBSERVATION PER HR: HCPCS

## 2024-04-18 PROCEDURE — 0202U NFCT DS 22 TRGT SARS-COV-2: CPT

## 2024-04-18 PROCEDURE — 87804 INFLUENZA ASSAY W/OPTIC: CPT

## 2024-04-18 PROCEDURE — 83880 ASSAY OF NATRIURETIC PEPTIDE: CPT

## 2024-04-18 PROCEDURE — 81003 URINALYSIS AUTO W/O SCOPE: CPT

## 2024-04-18 PROCEDURE — 84145 PROCALCITONIN (PCT): CPT

## 2024-04-18 PROCEDURE — 85379 FIBRIN DEGRADATION QUANT: CPT

## 2024-04-18 PROCEDURE — 93010 ELECTROCARDIOGRAM REPORT: CPT | Performed by: INTERNAL MEDICINE

## 2024-04-18 PROCEDURE — 96372 THER/PROPH/DIAG INJ SC/IM: CPT

## 2024-04-18 PROCEDURE — 6360000002 HC RX W HCPCS: Performed by: INTERNAL MEDICINE

## 2024-04-18 PROCEDURE — 85025 COMPLETE CBC W/AUTO DIFF WBC: CPT

## 2024-04-18 PROCEDURE — 82803 BLOOD GASES ANY COMBINATION: CPT

## 2024-04-18 PROCEDURE — 84703 CHORIONIC GONADOTROPIN ASSAY: CPT

## 2024-04-18 PROCEDURE — 84484 ASSAY OF TROPONIN QUANT: CPT

## 2024-04-18 RX ORDER — POTASSIUM CHLORIDE 7.45 MG/ML
10 INJECTION INTRAVENOUS PRN
Status: DISCONTINUED | OUTPATIENT
Start: 2024-04-18 | End: 2024-04-19 | Stop reason: HOSPADM

## 2024-04-18 RX ORDER — METHYLPREDNISOLONE SODIUM SUCCINATE 125 MG/2ML
80 INJECTION, POWDER, LYOPHILIZED, FOR SOLUTION INTRAMUSCULAR; INTRAVENOUS ONCE
Status: DISCONTINUED | OUTPATIENT
Start: 2024-04-18 | End: 2024-04-18

## 2024-04-18 RX ORDER — SODIUM CHLORIDE 0.9 % (FLUSH) 0.9 %
5-40 SYRINGE (ML) INJECTION EVERY 12 HOURS SCHEDULED
Status: DISCONTINUED | OUTPATIENT
Start: 2024-04-18 | End: 2024-04-19 | Stop reason: HOSPADM

## 2024-04-18 RX ORDER — ONDANSETRON 4 MG/1
4 TABLET, ORALLY DISINTEGRATING ORAL EVERY 8 HOURS PRN
Status: DISCONTINUED | OUTPATIENT
Start: 2024-04-18 | End: 2024-04-19 | Stop reason: HOSPADM

## 2024-04-18 RX ORDER — ACETAMINOPHEN 325 MG/1
650 TABLET ORAL EVERY 6 HOURS PRN
Status: DISCONTINUED | OUTPATIENT
Start: 2024-04-18 | End: 2024-04-19 | Stop reason: HOSPADM

## 2024-04-18 RX ORDER — ONDANSETRON 4 MG/1
4 TABLET, ORALLY DISINTEGRATING ORAL ONCE
Status: COMPLETED | OUTPATIENT
Start: 2024-04-18 | End: 2024-04-18

## 2024-04-18 RX ORDER — POLYETHYLENE GLYCOL 3350 17 G/17G
17 POWDER, FOR SOLUTION ORAL DAILY PRN
Status: DISCONTINUED | OUTPATIENT
Start: 2024-04-18 | End: 2024-04-19 | Stop reason: HOSPADM

## 2024-04-18 RX ORDER — SODIUM CHLORIDE 0.9 % (FLUSH) 0.9 %
5-40 SYRINGE (ML) INJECTION PRN
Status: DISCONTINUED | OUTPATIENT
Start: 2024-04-18 | End: 2024-04-19 | Stop reason: HOSPADM

## 2024-04-18 RX ORDER — PREDNISONE 20 MG/1
60 TABLET ORAL ONCE
Status: COMPLETED | OUTPATIENT
Start: 2024-04-18 | End: 2024-04-18

## 2024-04-18 RX ORDER — ONDANSETRON 2 MG/ML
4 INJECTION INTRAMUSCULAR; INTRAVENOUS EVERY 6 HOURS PRN
Status: DISCONTINUED | OUTPATIENT
Start: 2024-04-18 | End: 2024-04-19 | Stop reason: HOSPADM

## 2024-04-18 RX ORDER — DEXTROAMPHETAMINE SACCHARATE, AMPHETAMINE ASPARTATE, DEXTROAMPHETAMINE SULFATE AND AMPHETAMINE SULFATE 2.5; 2.5; 2.5; 2.5 MG/1; MG/1; MG/1; MG/1
20 TABLET ORAL 2 TIMES DAILY
Status: DISCONTINUED | OUTPATIENT
Start: 2024-04-19 | End: 2024-04-19 | Stop reason: HOSPADM

## 2024-04-18 RX ORDER — ONDANSETRON 2 MG/ML
4 INJECTION INTRAMUSCULAR; INTRAVENOUS ONCE
Status: DISCONTINUED | OUTPATIENT
Start: 2024-04-18 | End: 2024-04-18

## 2024-04-18 RX ORDER — SODIUM CHLORIDE 9 MG/ML
INJECTION, SOLUTION INTRAVENOUS PRN
Status: DISCONTINUED | OUTPATIENT
Start: 2024-04-18 | End: 2024-04-19 | Stop reason: HOSPADM

## 2024-04-18 RX ORDER — POTASSIUM CHLORIDE 20 MEQ/1
40 TABLET, EXTENDED RELEASE ORAL PRN
Status: DISCONTINUED | OUTPATIENT
Start: 2024-04-18 | End: 2024-04-19 | Stop reason: HOSPADM

## 2024-04-18 RX ORDER — MAGNESIUM HYDROXIDE/ALUMINUM HYDROXICE/SIMETHICONE 120; 1200; 1200 MG/30ML; MG/30ML; MG/30ML
30 SUSPENSION ORAL EVERY 6 HOURS PRN
Status: DISCONTINUED | OUTPATIENT
Start: 2024-04-18 | End: 2024-04-19 | Stop reason: HOSPADM

## 2024-04-18 RX ORDER — ACETAMINOPHEN 650 MG/1
650 SUPPOSITORY RECTAL EVERY 6 HOURS PRN
Status: DISCONTINUED | OUTPATIENT
Start: 2024-04-18 | End: 2024-04-19 | Stop reason: HOSPADM

## 2024-04-18 RX ORDER — ENOXAPARIN SODIUM 100 MG/ML
40 INJECTION SUBCUTANEOUS DAILY
Status: DISCONTINUED | OUTPATIENT
Start: 2024-04-18 | End: 2024-04-19 | Stop reason: HOSPADM

## 2024-04-18 RX ORDER — MAGNESIUM SULFATE IN WATER 40 MG/ML
2000 INJECTION, SOLUTION INTRAVENOUS PRN
Status: DISCONTINUED | OUTPATIENT
Start: 2024-04-18 | End: 2024-04-19 | Stop reason: HOSPADM

## 2024-04-18 RX ADMIN — ENOXAPARIN SODIUM 40 MG: 100 INJECTION SUBCUTANEOUS at 08:55

## 2024-04-18 RX ADMIN — PREDNISONE 60 MG: 20 TABLET ORAL at 01:32

## 2024-04-18 RX ADMIN — IBUPROFEN 600 MG: 600 TABLET, FILM COATED ORAL at 00:10

## 2024-04-18 RX ADMIN — Medication 10 ML: at 20:39

## 2024-04-18 RX ADMIN — ACETAMINOPHEN 650 MG: 325 TABLET ORAL at 00:10

## 2024-04-18 RX ADMIN — ONDANSETRON 4 MG: 4 TABLET, ORALLY DISINTEGRATING ORAL at 01:32

## 2024-04-18 RX ADMIN — SODIUM CHLORIDE: 9 INJECTION, SOLUTION INTRAVENOUS at 08:41

## 2024-04-18 RX ADMIN — ACETAMINOPHEN 650 MG: 325 TABLET ORAL at 15:34

## 2024-04-18 ASSESSMENT — PAIN DESCRIPTION - PAIN TYPE: TYPE: ACUTE PAIN

## 2024-04-18 ASSESSMENT — PAIN DESCRIPTION - LOCATION
LOCATION: BACK;SHOULDER
LOCATION: GENERALIZED
LOCATION: BACK

## 2024-04-18 ASSESSMENT — PAIN DESCRIPTION - ORIENTATION: ORIENTATION: MID

## 2024-04-18 ASSESSMENT — ENCOUNTER SYMPTOMS
COUGH: 1
COLOR CHANGE: 0
SHORTNESS OF BREATH: 1
VOMITING: 0
ABDOMINAL PAIN: 0
RHINORRHEA: 1

## 2024-04-18 ASSESSMENT — PAIN SCALES - GENERAL
PAINLEVEL_OUTOF10: 8
PAINLEVEL_OUTOF10: 0
PAINLEVEL_OUTOF10: 3
PAINLEVEL_OUTOF10: 2
PAINLEVEL_OUTOF10: 0
PAINLEVEL_OUTOF10: 0

## 2024-04-18 ASSESSMENT — PAIN - FUNCTIONAL ASSESSMENT
PAIN_FUNCTIONAL_ASSESSMENT: ACTIVITIES ARE NOT PREVENTED
PAIN_FUNCTIONAL_ASSESSMENT: PREVENTS OR INTERFERES SOME ACTIVE ACTIVITIES AND ADLS

## 2024-04-18 ASSESSMENT — PAIN DESCRIPTION - DESCRIPTORS
DESCRIPTORS: ACHING
DESCRIPTORS: ACHING

## 2024-04-18 ASSESSMENT — PAIN DESCRIPTION - FREQUENCY
FREQUENCY: CONTINUOUS
FREQUENCY: CONTINUOUS

## 2024-04-18 NOTE — PROGRESS NOTES
Report called to 5th floor RN to resume care. Updated on patients chief complaint and reason for admission. All questions answered. VSS upon exit from unit.

## 2024-04-18 NOTE — ED NOTES
This RN introduced self to pt. Pt a/ox4, remains on tele with VS obtained. Call light in reach, pt educated on use , and to alert staff for needs. All questions answered at this time.  Pt medlist updated with pt at this time

## 2024-04-18 NOTE — ED NOTES
Pt in bed awake. Pt ambulated to bathroom with steady gait. Pt refuse to change into gown and also refused to have  side rails up.

## 2024-04-18 NOTE — ED PROVIDER NOTES
Result Value    Basophils Absolute 0.3 (*)     All other components within normal limits   COMPREHENSIVE METABOLIC PANEL - Abnormal; Notable for the following components:    BUN 6 (*)     Creatinine <0.5 (*)     ALT <5 (*)     AST <5 (*)     All other components within normal limits   COVID-19, RAPID   RAPID INFLUENZA A/B ANTIGENS   RESPIRATORY PANEL, MOLECULAR, WITH COVID-19   TROPONIN   HCG, SERUM, QUALITATIVE   URINALYSIS WITH REFLEX TO CULTURE   D-DIMER, QUANTITATIVE   BRAIN NATRIURETIC PEPTIDE   PROCALCITONIN   COVID-19   COVID-19   COVID-19   COVID-19       When ordered only abnormal lab results are displayed. All other labs were within normal range or not returned as of this dictation.    EKG: When ordered, EKG's are interpreted by the Emergency Department Physician in the absence of a cardiologist.  Please see their note for interpretation of EKG.    RADIOLOGY:   Non-plain film images such as CT, Ultrasound and MRI are read by the radiologist. Plain radiographic images are visualized and preliminarily interpreted by the ED Provider with the below findings:    Interpretation per the Radiologist below, if available at the time of this note:    XR CHEST (2 VW)   Final Result   No acute cardiopulmonary process.           No results found.    No results found.    PROCEDURES   Unless otherwise noted below, none     Procedures    CRITICAL CARE TIME (.cctime)   I performed a total Critical Care time of 31 minutes, excluding separately reportable procedures.  There was a high probability of clinically significant/life threatening deterioration in the patient's condition which required my urgent intervention. Not limited to multiple reexaminations, discussions with attending physician and consultants.    PAST MEDICAL HISTORY      has a past medical history of COVID-19 (07/16/2021), Cyclical vomiting, Depression (age 15), and History of chicken pox (06/05/1996).     EMERGENCY DEPARTMENT COURSE and DIFFERENTIAL

## 2024-04-18 NOTE — PROGRESS NOTES
Pt arrived to room 51. Pt med surg-non tele. Pt ambulates to bed. Pt on 2 L of 02. Pt orientated to room and call light. Electronically signed by Ofelia Ott RN on 4/18/2024 at 3:28 PM

## 2024-04-18 NOTE — CARE COORDINATION
Case Management Assessment  Initial Evaluation    Date/Time of Evaluation: 4/18/2024 11:12 AM  Assessment Completed by: FREDDIE Louis, KARSON    If patient is discharged prior to next notation, then this note serves as note for discharge by case management.    Patient Name: Meera Starr                   YOB: 1995  Diagnosis: Hypoxia [R09.02]                   Date / Time: 4/17/2024 11:42 PM    Patient Admission Status: Observation   Readmission Risk (Low < 19, Mod (19-27), High > 27): No data recorded  Current PCP: No primary care provider on file.  PCP verified by CM? Yes    Chart Reviewed: Yes      History Provided by: Patient  Patient Orientation: Alert and Oriented    Patient Cognition: Alert    Hospitalization in the last 30 days (Readmission):  No    If yes, Readmission Assessment in CM Navigator will be completed.    Advance Directives:      Code Status: Full Code   Patient's Primary Decision Maker is: Legal Next of Kin      Discharge Planning:    Patient lives with: Alone, Other (Comment) (has 3 dogs.) Type of Home: House  Primary Care Giver: Self  Patient Support Systems include: Family Members, Other (Comment), Parent (pets, has three dogs.)   Current Financial resources: None  Current community resources: None  Current services prior to admission: None            Current DME:              Type of Home Care services:  None    ADLS  Prior functional level: Independent in ADLs/IADLs  Current functional level: Independent in ADLs/IADLs    PT AM-PAC:   /24  OT AM-PAC:   /24    Family can provide assistance at DC: Yes  Would you like Case Management to discuss the discharge plan with any other family members/significant others, and if so, who? Yes (mother if needed.)  Plans to Return to Present Housing: Yes  Other Identified Issues/Barriers to RETURNING to current housing: No insurance.  Potential Assistance needed at discharge: Prescription Assistance (Referred to [facility]

## 2024-04-18 NOTE — PROGRESS NOTES
EDSBAR report has been reviewed.      Electronically signed by Ofelia Ott RN on 4/18/2024 at 2:49 PM

## 2024-04-18 NOTE — ED NOTES
pt placed on 2 L O2 via nasal cannula at this time due to SpO2 level dropping to 89% during ambulation.

## 2024-04-18 NOTE — PLAN OF CARE
The patient was admitted overnight by my colleague agree with the plan all questions or concerns were addressed.  Apparently patient has metapneumovirus infection.  Underlying ADHD as well.    Reportedly patient had a positive COVID-19 test at home but does not have active COVID-19 infection at the moment.  Pulmonology will evaluate the patient during the day.    My understanding is patient has a previous history of COVID-19 infection and that is why patient might have some lung disease with residual that what triggered the patient to go to hypoxia.  Will get CT scan of the chest without contrast

## 2024-04-18 NOTE — PROGRESS NOTES
4 Eyes Skin Assessment     NAME:  Meera Starr  YOB: 1995  MEDICAL RECORD NUMBER:  [id number]    The patient is being assessed for  Admission    I agree that at least one RN has performed a thorough Head to Toe Skin Assessment on the patient. ALL assessment sites listed below have been assessed.      Areas assessed by both nurses:            Does the Patient have a Wound? No noted wound(s)       Sadiq Prevention initiated by RN: No  Wound Care Orders initiated by RN: No    Pressure Injury (Stage 3,4, Unstageable, DTI, NWPT, and Complex wounds) if present, place Wound referral order by RN under : No    New Ostomies, if present place, Ostomy referral order under : No     Nurse 1 eSignature: Electronically signed by Ofelia Ott RN on 4/18/24 at 3:28 PM EDT    **SHARE this note so that the co-signing nurse can place an eSignature**    Nurse 2 eSignature: Electronically signed by Irvin Nguyen RN on 4/19/24 at 6:30 AM EDT

## 2024-04-18 NOTE — H&P
V2.0  History and Physical      Name:  Meera Starr /Age/Sex: 1995  (28 y.o. female)   MRN & CSN:  [id number] & [id number] Encounter Date/Time: 2024 2:17 AM EDT   Location:  [address] [place] [address] PCP: No primary care provider on file.       Hospital Day: 2    Assessment and Plan:   Meera Starr is a 28 y.o. female smoker with a pmh of depression, ADHD who presents with Hypoxia    Hospital Problems             Last Modified POA    * (Principal) Hypoxia 2024 Yes       Plan:  Check VBG, CT chest  Supplemental oxygen to keep sats between 90 and 94%  Consider pulmonology evaluation    Disposition:   Current Living situation: Home  Expected Disposition: Home  Estimated D/C: 2 days    Diet ADULT DIET; Regular   DVT Prophylaxis [x] Lovenox, []  Heparin, [] SCDs, [] Ambulation,  [] Eliquis, [] Xarelto, [] Coumadin   Code Status Full Code   Surrogate Decision Maker/ POA Mother     Personally reviewed Lab Studies and Imaging     Discussed management of the case with no one who recommended n/a.    EKG interpreted personally and results normal.    Imaging that was interpreted personally includes chest x-ray and results negative for any acute cardiopulmonary process.    Drugs that require monitoring for toxicity include none and the method of monitoring was n/a.        History from:     patient    History of Present Illness:     Chief complaint: Shortness of breath  Meera Starr is a 28 y.o. female smoker (vapes only now) with pmh of depression and ADHD who presents with shortness of breath.  Patient has been having upper respiratory symptoms including rhinorrhea, mild sore throat with nasal congestion and sinus pressure, malaise, fatigue for about a week and then today she started having shortness of breath with exertion and a sensation of chest tightness.  When she checked her pulse ox it showed 87%.  She denies any cough, lower extremity edema or calf tenderness.  Patient works in a restaurant both in front and in the

## 2024-04-19 VITALS
RESPIRATION RATE: 18 BRPM | HEART RATE: 86 BPM | DIASTOLIC BLOOD PRESSURE: 70 MMHG | TEMPERATURE: 97.9 F | OXYGEN SATURATION: 99 % | HEIGHT: 68 IN | BODY MASS INDEX: 23.49 KG/M2 | WEIGHT: 154.98 LBS | SYSTOLIC BLOOD PRESSURE: 114 MMHG

## 2024-04-19 PROCEDURE — 6370000000 HC RX 637 (ALT 250 FOR IP): Performed by: STUDENT IN AN ORGANIZED HEALTH CARE EDUCATION/TRAINING PROGRAM

## 2024-04-19 PROCEDURE — 99222 1ST HOSP IP/OBS MODERATE 55: CPT | Performed by: INTERNAL MEDICINE

## 2024-04-19 PROCEDURE — 96372 THER/PROPH/DIAG INJ SC/IM: CPT

## 2024-04-19 PROCEDURE — G0378 HOSPITAL OBSERVATION PER HR: HCPCS

## 2024-04-19 PROCEDURE — 94680 O2 UPTK RST&XERS DIR SIMPLE: CPT

## 2024-04-19 PROCEDURE — 6370000000 HC RX 637 (ALT 250 FOR IP): Performed by: REGISTERED NURSE

## 2024-04-19 PROCEDURE — 94760 N-INVAS EAR/PLS OXIMETRY 1: CPT

## 2024-04-19 PROCEDURE — 6360000002 HC RX W HCPCS: Performed by: INTERNAL MEDICINE

## 2024-04-19 RX ORDER — DIPHENHYDRAMINE HCL 25 MG
25 TABLET ORAL ONCE
Status: COMPLETED | OUTPATIENT
Start: 2024-04-19 | End: 2024-04-19

## 2024-04-19 RX ORDER — IBUPROFEN 600 MG/1
600 TABLET ORAL EVERY 6 HOURS PRN
Qty: 40 TABLET | Refills: 0 | Status: SHIPPED | OUTPATIENT
Start: 2024-04-19

## 2024-04-19 RX ADMIN — SERTRALINE 150 MG: 100 TABLET, FILM COATED ORAL at 12:51

## 2024-04-19 RX ADMIN — DEXTROAMPHETAMINE SACCHARATE, AMPHETAMINE ASPARTATE, DEXTROAMPHETAMINE SULFATE, AMPHETAMINE SULFATE TABLETS, 10 MG,CLL 20 MG: 2.5; 2.5; 2.5; 2.5 TABLET ORAL at 09:12

## 2024-04-19 RX ADMIN — DIPHENHYDRAMINE HCL 25 MG: 25 TABLET ORAL at 01:57

## 2024-04-19 RX ADMIN — ENOXAPARIN SODIUM 40 MG: 100 INJECTION SUBCUTANEOUS at 09:12

## 2024-04-19 ASSESSMENT — PAIN SCALES - GENERAL: PAINLEVEL_OUTOF10: 0

## 2024-04-19 NOTE — CONSULTS
REASON FOR CONSULTATION/CC: Shortness of breath      Consult at request of Behzad Rubin MD     PCP: No primary care provider on file.  Established Pulmonologist:  None    Chief Complaint   Patient presents with    Shortness of Breath     Pt presents with SOB that started yesterday and chest pain that started today. Pt believes she has COVID. Pt also reporting runny nose, congestion, and loss of smell and taste.     Chest Pain       HISTORY OF PRESENT ILLNESS: Meera Starr is a 28 y.o. year old female presents with shortness of breath.  Symptoms been present for 24 hours.  Associated coughing.  Has not gotten better or worse.  Has not found anything that makes it better or worse.  Initially was hypoxic on home pulse oximeter down to 83% second to the ER.  She was admitted overnight for refractory hypoxemia.  During my visit patient is tolerating room air and states she feels back to baseline.  CT of the chest showed no acute airspace process bilaterally.      Past Medical History:   Diagnosis Date    ADHD (attention deficit hyperactivity disorder)     COVID-19 07/16/2021    Cyclical vomiting     Depression age 15    Anxiety    History of chicken pox 06/05/1996         No past surgical history on file.    Family Hx  family history includes Alcohol Abuse in her father; Asthma in her maternal grandmother; Cancer (age of onset: 32) in her mother; Colon Cancer (age of onset: 60) in her maternal grandmother; Depression in her mother; Heart Disease (age of onset: 60) in her maternal grandfather; High Blood Pressure in her maternal grandfather; High Cholesterol in her mother; Stroke in her maternal grandmother; Thyroid Disease in her mother.    Social Hx   reports that she quit smoking about 9 years ago. Her smoking use included cigarettes. She started smoking about 12 years ago. She has a 3.0 pack-year smoking history. She has never used smokeless tobacco.    Scheduled Meds:   sodium chloride flush  5-40 mL IntraVENous 2  75     PT/INR: No results for input(s): \"PROTIME\", \"INR\" in the last 72 hours.  APTT: No results for input(s): \"APTT\" in the last 72 hours.  UA:  Recent Labs     04/18/24  0022   COLORU Yellow   PHUR 7.5   CLARITYU Clear   SPECGRAV 1.013   LEUKOCYTESUR Negative   UROBILINOGEN 0.2   BILIRUBINUR Negative   BLOODU Negative   GLUCOSEU Negative     No results for input(s): \"PHART\", \"JER7YAH\", \"PO2ART\" in the last 72 hours.         Radiology Review:  Pertinent images / reports were reviewed as a part of this visit.      Chest CT images reviewed personally by me, interpretation as follows:  -No acute airspace process bilaterally.         Assessment/Plan:       Upper respiratory infection, due to human metapneumovirus  -Tolerating room air  -Acapella/I-S    From pulmonary standpoint okay for discharge can follow-up as below.    Future Appointments   Date Time Provider Department Center   5/22/2024  8:40 AM [facility]af[facility] Hinojosa [facility]              This note was transcribed using Dragon Dictation software. Please disregard any translational errors.    Thank you for the consult    Josafat Hinojosa  [facility] Pulmonary, Sleep and Critical Care Medicine

## 2024-04-19 NOTE — PLAN OF CARE
Patient admitted for complaints of shortness of breath and hypoxia. Pt did receive a dose of steroids and was on oxygen via nasal canula throughout the night. She has had a home oxygen eval and has not qualified for oxygen. Plan is for pt to be discharged to home today.

## 2024-04-19 NOTE — PROGRESS NOTES
04/19/24 1124   Resting (Room Air)   SpO2 98   HR 84   During Walk (On O2)   SpO2 99   HR 98   Rate of Dyspnea 0   After Walk   SpO2 99   HR 85   Rate of Dyspnea 0   Does the Patient Qualify for Home O2 No   Does the Patient Need Portable Oxygen Tanks No

## 2024-04-19 NOTE — CARE COORDINATION
Case Management Discharge Note          Date / Time of Note: 4/19/2024 2:03 PM                  Patient Name: Meera Starr   YOB: 1995  Diagnosis: Shortness of breath [R06.02]  Hypoxemia [R09.02]  Hypoxia [R09.02]  Chest pain, unspecified type [R07.9]   Date / Time: 4/17/2024 11:42 PM    Financial:  Payor: PENDING MEDICAID / Plan: PENDING MEDICAID / Product Type: *No Product type* /      Pharmacy:    [facility] [place], [place] - [address] - P [phone] - F [phone]  [address] [address]  Phone: [phone] Fax: [phone]      Assistance purchasing medications?: Potential Assistance Purchasing Medications: No  Assistance provided by Case Management: None at this time    DISCHARGE Disposition: Home- No Services Needed    Transportation:  Transportation PLAN for discharge: family   Mode of Transport: Private Car    Additional CM Notes:   DC order noted.  Family/friend to transport.  Med sent to retail pharmacy.  Referral placed to [facility] for ongoing medications.    The Plan for Transition of Care is related to the following treatment goals of Shortness of breath [R06.02]  Hypoxemia [R09.02]  Hypoxia [R09.02]  Chest pain, unspecified type [R07.9]    The Patient and/or patient representative Meera and her family were provided with a choice of provider and agrees with the discharge plan Yes    Freedom of choice list was provided with basic dialogue that supports the patient's individualized plan of care/goals and shares the quality data associated with the providers. Not Indicated    Tori Bailey RN  [facility]   Case Management Department  Ph: [phone]

## 2024-04-19 NOTE — PROGRESS NOTES
Discharge instructions reviewed with patient. Reviewed testing ordered, PFT and given direction on how to schedule that. Also reviewed follow up appointments and the importance of keeping appointments. Reviewed medications including time of next dose, purpose of medications and possible side effects. Pt verbalized understanding. Pt chose to ambulate to private vehicle and will pick new medication up in the retail pharmacy.

## 2024-04-19 NOTE — PLAN OF CARE
Problem: Pain  Goal: Verbalizes/displays adequate comfort level or baseline comfort level  Outcome: Progressing     Problem: Respiratory - Adult  Goal: Achieves optimal ventilation and oxygenation  Outcome: Progressing  Flowsheets (Taken 4/19/2024 0012)  Achieves optimal ventilation and oxygenation:   Position to facilitate oxygenation and minimize respiratory effort   Assess for changes in mentation and behavior   Assess for changes in respiratory status

## 2024-04-19 NOTE — DISCHARGE INSTR - DIET

## 2024-04-19 NOTE — DISCHARGE SUMMARY
V2.0  Discharge Summary    Name:  Meera Starr /Age/Sex: 1995 (28 y.o. female)   Admit Date: 2024  Discharge Date: 24    MRN & CSN:  [id number] & [id number] Encounter Date and Time 24 12:15 PM EDT    Attending:  Behzad Rubin MD Discharging Provider: Behzad Rubin MD       Hospital Course:     Brief HPI: Meera Starr is a 28 y.o. female who presented with hypoxia    Brief Problem Based Course:   Acute hypoxic respiratory failure in the setting of metapneumovirus infection.  Reportedly patient had a COVID-19 test positive at home?  2 consecutive COVID-19 test were negative during hospital stay presented with sats in the 80s started on 2 L nasal cannula.  Today patient is on room air past ambulatory pulse ox test.  Patient has allergy to steroids with anaphylactic type of reactions reported by the patient.  Supportive care for now.  CT chest was done because of underlying previous history of 2-3 episodes of COVID-19 infection.  No underlying scarring noticed which is good.  Explained to the patient.  Medically stable for discharge with recommendation to follow-up with PCP outpatient.  Supportive care.  Patient does have animals at home including dogs and chicken.  Instructed about masks and gloves for some time.  Patient showed understanding.  Attention deficit hyperactivity disorder on Adderall  Mood disorders on Zoloft      The patient expressed appropriate understanding of, and agreement with the discharge recommendations, medications, and plan.     Consults this admission:  IP CONSULT TO PULMONOLOGY    Discharge Diagnosis:   Hypoxia    Metapneumovirus infection    Discharge Instruction:   Follow up appointments: PCP  Primary care physician: No primary care provider on file. within 2 weeks  Diet: regular diet   Activity: activity as tolerated  Disposition: Discharged to:   [x]Home, []HHC, []SNF, []Acute Rehab, []Hospice   Condition on discharge: Stable  Labs and Tests to be Followed up as an  08/13/2022 07:40 AM     Organism:   Lab Results   Component Value Date/Time    ORG Human Metapneumovirus by PCR 04/18/2024 02:04 AM       Time Spent Discharging patient 35 minutes    Electronically signed by Behzad Rubin MD on 4/19/2024 at 12:15 PM

## 2024-04-19 NOTE — PROGRESS NOTES
Dr. Rubin in to see patient. He is planning on discharging pt but she will need a home oxygen evaluation. Pt is aware.

## 2024-04-19 NOTE — DISCHARGE INSTR - COC
Continuity of Care Form    Patient Name: Meera Starr   :  1995  MRN:  [id number]    Admit date:  2024  Discharge date:  ***    Code Status Order: Full Code   Advance Directives:     Admitting Physician:  Remedios Berger MD  PCP: No primary care provider on file.    Discharging Nurse: ***  Discharging Hospital Unit/Room#: W2E-3976/5133-01  Discharging Unit Phone Number: ***    Emergency Contact:   Extended Emergency Contact Information  Primary Emergency Contact: Jemma Starr  Address: [address] [place] [address] [address]           [place] [address] [facility]  Home Phone: [phone]  Mobile Phone: [phone]  Relation: Parent    Past Surgical History:  No past surgical history on file.    Immunization History:   Immunization History   Administered Date(s) Administered    DTaP 1995, 1994, 1995, 1995, 1999    Hepatitis B 1995, 1994, 1995    Hib, unspecified 1994, 1994, 1995, 1995    Influenza Virus Vaccine 2014    MMR, PRIORIX, M-M-R II, (age 12m+), SC, 0.5mL 1995, 1999    Meningococcal ACWY, MENACTRA (MenACWY-D), (age 9m-55y), IM, 0.5mL 10/04/2010    Poliovirus, IPOL, (age 6w+), SC/IM, 0.5mL 1994, 1994, 1995, 1999    TDaP, ADACEL (age 10y-64y), BOOSTRIX (age 10y+), IM, 0.5mL 10/04/2010       Active Problems:  Patient Active Problem List   Diagnosis Code    Depression F32.A    Anxiety F41.9    Hypoxia R09.02       Isolation/Infection:   Isolation            Contact          Patient Infection Status       Infection Onset Added Last Indicated Last Indicated By Review Planned Expiration Resolved Resolved By    Human Metapneumovirus 24 Respiratory Panel, Molecular, with COVID-19 (Restricted: peds pts or suitable admitted adults) 24      Resolved    COVID-19 21 COVID-19, Rapid   21 Infection                         Nurse Assessment:  Last Vital Signs: /70   Pulse 86   Temp 97.9 °F (36.6 °C)   Resp 18   Ht 1.727 m (5' 8\")   Wt 70.3 kg (154 lb 15.7 oz)   SpO2 99%   BMI 23.57 kg/m²     Last documented pain score (0-10 scale): Pain Level: 0  Last Weight:   Wt Readings from Last 1 Encounters:   04/19/24 70.3 kg (154 lb 15.7 oz)     Mental Status:  {IP PT MENTAL STATUS:20030}    IV Access:  { MYAH IV ACCESS:163212339}    Nursing Mobility/ADLs:  Walking   {CHP DME ADLs:870109069}  Transfer  {CHP DME ADLs:319664484}  Bathing  {CHP DME ADLs:533946735}  Dressing  {CHP DME ADLs:312333127}  Toileting  {CHP DME ADLs:673737865}  Feeding  {CHP DME ADLs:949417903}  Med Admin  {P DME ADLs:454087587}  Med Delivery   { MYAH MED Delivery:956317677}    Wound Care Documentation and Therapy:        Elimination:  Continence:   Bowel: {YES / NO:19727}  Bladder: {YES / NO:19727}  Urinary Catheter: {Urinary Catheter:049300711}   Colostomy/Ileostomy/Ileal Conduit: {YES / NO:19727}       Date of Last BM: ***    Intake/Output Summary (Last 24 hours) at 4/19/2024 1304  Last data filed at 4/19/2024 1200  Gross per 24 hour   Intake 1200 ml   Output --   Net 1200 ml     I/O last 3 completed shifts:  In: 720 [P.O.:720]  Out: -     Safety Concerns:     { MYAH Safety Concerns:334522877}    Impairments/Disabilities:      { MYAH Impairments/Disabilities:629123431}    Nutrition Therapy:  Current Nutrition Therapy:   { MYAH Diet List:051996848}    Routes of Feeding: {P DME Other Feedings:530109936}  Liquids: {Slp liquid thickness:35337}  Daily Fluid Restriction: {CHP DME Yes amt example:199234218}  Last Modified Barium Swallow with Video (Video Swallowing Test): {Done Not Done Date:304088012}    Treatments at the Time of Hospital Discharge:   Respiratory Treatments: ***  Oxygen Therapy:  {Therapy; copd oxygen:63782}  Ventilator:    { CC Vent List:855665439}    Rehab Therapies: {THERAPEUTIC INTERVENTION:0420147033}  Weight Bearing

## 2024-04-19 NOTE — PROGRESS NOTES
Patient requested IV be removed due to itching from the tegaderm, IV removed per request, patient asking for Benadryl for itching, NP ordered one time dose, medication given. Will continue to monitor.

## 2024-09-05 ENCOUNTER — HOSPITAL ENCOUNTER (EMERGENCY)
Age: 29
Discharge: HOME | End: 2024-09-06
Attending: EMERGENCY MEDICINE
Payer: COMMERCIAL

## 2024-09-05 ENCOUNTER — APPOINTMENT (OUTPATIENT)
Dept: CT IMAGING | Age: 29
End: 2024-09-05
Payer: COMMERCIAL

## 2024-09-05 DIAGNOSIS — R06.02 SHORTNESS OF BREATH: Primary | ICD-10-CM

## 2024-09-05 LAB
BASE EXCESS BLDV CALC-SCNC: -0.4 MMOL/L
CO2 BLDV-SCNC: 27 MMOL/L
COHGB MFR BLDV: 1.1 %
HCO3 BLDV-SCNC: 26 MMOL/L (ref 23–29)
METHGB MFR BLDV: 0.5 %
O2 THERAPY: NORMAL
PCO2 BLDV: 46.6 MMHG (ref 40–50)
PH BLDV: 7.35 [PH] (ref 7.35–7.45)
PO2 BLDV: 33 MMHG
SAO2 % BLDV: 60 %

## 2024-09-05 PROCEDURE — 93005 ELECTROCARDIOGRAM TRACING: CPT | Performed by: EMERGENCY MEDICINE

## 2024-09-05 PROCEDURE — 82803 BLOOD GASES ANY COMBINATION: CPT

## 2024-09-05 PROCEDURE — 87635 SARS-COV-2 COVID-19 AMP PRB: CPT

## 2024-09-05 PROCEDURE — 2580000003 HC RX 258: Performed by: EMERGENCY MEDICINE

## 2024-09-05 PROCEDURE — 80048 BASIC METABOLIC PNL TOTAL CA: CPT

## 2024-09-05 PROCEDURE — 99285 EMERGENCY DEPT VISIT HI MDM: CPT

## 2024-09-05 PROCEDURE — 6370000000 HC RX 637 (ALT 250 FOR IP): Performed by: EMERGENCY MEDICINE

## 2024-09-05 PROCEDURE — 85025 COMPLETE CBC W/AUTO DIFF WBC: CPT

## 2024-09-05 RX ORDER — ACETAMINOPHEN 325 MG/1
650 TABLET ORAL ONCE
Status: COMPLETED | OUTPATIENT
Start: 2024-09-05 | End: 2024-09-05

## 2024-09-05 RX ORDER — IBUPROFEN 400 MG/1
400 TABLET, FILM COATED ORAL ONCE
Status: COMPLETED | OUTPATIENT
Start: 2024-09-05 | End: 2024-09-05

## 2024-09-05 RX ORDER — 0.9 % SODIUM CHLORIDE 0.9 %
1000 INTRAVENOUS SOLUTION INTRAVENOUS ONCE
Status: COMPLETED | OUTPATIENT
Start: 2024-09-05 | End: 2024-09-06

## 2024-09-05 RX ORDER — BENZONATATE 100 MG/1
200 CAPSULE ORAL ONCE
Status: COMPLETED | OUTPATIENT
Start: 2024-09-05 | End: 2024-09-05

## 2024-09-05 RX ADMIN — BENZONATATE 200 MG: 100 CAPSULE ORAL at 23:27

## 2024-09-05 RX ADMIN — SODIUM CHLORIDE 1000 ML: 9 INJECTION, SOLUTION INTRAVENOUS at 23:41

## 2024-09-05 RX ADMIN — IBUPROFEN 400 MG: 400 TABLET, FILM COATED ORAL at 23:28

## 2024-09-05 RX ADMIN — ACETAMINOPHEN 325MG 650 MG: 325 TABLET ORAL at 23:27

## 2024-09-05 ASSESSMENT — PAIN SCALES - GENERAL: PAINLEVEL_OUTOF10: 8

## 2024-09-05 ASSESSMENT — PAIN DESCRIPTION - DESCRIPTORS: DESCRIPTORS: HEAVINESS;TIGHTNESS

## 2024-09-05 ASSESSMENT — PAIN - FUNCTIONAL ASSESSMENT: PAIN_FUNCTIONAL_ASSESSMENT: 0-10

## 2024-09-05 ASSESSMENT — PAIN DESCRIPTION - LOCATION: LOCATION: CHEST

## 2024-09-06 ENCOUNTER — APPOINTMENT (OUTPATIENT)
Dept: CT IMAGING | Age: 29
End: 2024-09-06
Payer: COMMERCIAL

## 2024-09-06 VITALS
TEMPERATURE: 99 F | SYSTOLIC BLOOD PRESSURE: 120 MMHG | HEART RATE: 57 BPM | DIASTOLIC BLOOD PRESSURE: 76 MMHG | HEIGHT: 68 IN | OXYGEN SATURATION: 99 % | BODY MASS INDEX: 25.93 KG/M2 | RESPIRATION RATE: 17 BRPM | WEIGHT: 171.1 LBS

## 2024-09-06 LAB
ANION GAP SERPL CALCULATED.3IONS-SCNC: 11 MMOL/L (ref 3–16)
BASOPHILS # BLD: 0 K/UL (ref 0–0.2)
BASOPHILS NFR BLD: 0.6 %
BUN SERPL-MCNC: 6 MG/DL (ref 7–20)
CALCIUM SERPL-MCNC: 9.1 MG/DL (ref 8.3–10.6)
CHLORIDE SERPL-SCNC: 105 MMOL/L (ref 99–110)
CO2 SERPL-SCNC: 23 MMOL/L (ref 21–32)
CREAT SERPL-MCNC: <0.5 MG/DL (ref 0.6–1.1)
DEPRECATED RDW RBC AUTO: 13.4 % (ref 12.4–15.4)
EKG ATRIAL RATE: 57 BPM
EKG DIAGNOSIS: NORMAL
EKG P AXIS: 40 DEGREES
EKG P-R INTERVAL: 134 MS
EKG Q-T INTERVAL: 420 MS
EKG QRS DURATION: 86 MS
EKG QTC CALCULATION (BAZETT): 408 MS
EKG R AXIS: 17 DEGREES
EKG T AXIS: 6 DEGREES
EKG VENTRICULAR RATE: 57 BPM
EOSINOPHIL # BLD: 0.3 K/UL (ref 0–0.6)
EOSINOPHIL NFR BLD: 5.9 %
GFR SERPLBLD CREATININE-BSD FMLA CKD-EPI: >90 ML/MIN/{1.73_M2}
GLUCOSE SERPL-MCNC: 87 MG/DL (ref 70–99)
HCT VFR BLD AUTO: 36.5 % (ref 36–48)
HGB BLD-MCNC: 13 G/DL (ref 12–16)
LYMPHOCYTES # BLD: 0.9 K/UL (ref 1–5.1)
LYMPHOCYTES NFR BLD: 16.3 %
MCH RBC QN AUTO: 31.9 PG (ref 26–34)
MCHC RBC AUTO-ENTMCNC: 35.6 G/DL (ref 31–36)
MCV RBC AUTO: 89.4 FL (ref 80–100)
MONOCYTES # BLD: 0.6 K/UL (ref 0–1.3)
MONOCYTES NFR BLD: 10.7 %
NEUTROPHILS # BLD: 3.7 K/UL (ref 1.7–7.7)
NEUTROPHILS NFR BLD: 66.5 %
PLATELET # BLD AUTO: 240 K/UL (ref 135–450)
PMV BLD AUTO: 8.6 FL (ref 5–10.5)
POTASSIUM SERPL-SCNC: 4.3 MMOL/L (ref 3.5–5.1)
RBC # BLD AUTO: 4.08 M/UL (ref 4–5.2)
SARS-COV-2 RDRP RESP QL NAA+PROBE: NOT DETECTED
SODIUM SERPL-SCNC: 139 MMOL/L (ref 136–145)
WBC # BLD AUTO: 5.6 K/UL (ref 4–11)

## 2024-09-06 PROCEDURE — 71260 CT THORAX DX C+: CPT

## 2024-09-06 PROCEDURE — 93010 ELECTROCARDIOGRAM REPORT: CPT | Performed by: INTERNAL MEDICINE

## 2024-09-06 PROCEDURE — 6360000004 HC RX CONTRAST MEDICATION: Performed by: EMERGENCY MEDICINE

## 2024-09-06 RX ORDER — IOPAMIDOL 755 MG/ML
75 INJECTION, SOLUTION INTRAVASCULAR
Status: COMPLETED | OUTPATIENT
Start: 2024-09-06 | End: 2024-09-06

## 2024-09-06 RX ORDER — BENZONATATE 100 MG/1
100-200 CAPSULE ORAL 3 TIMES DAILY PRN
Qty: 40 CAPSULE | Refills: 0 | Status: SHIPPED | OUTPATIENT
Start: 2024-09-06 | End: 2024-09-16

## 2024-09-06 RX ADMIN — IOPAMIDOL 75 ML: 755 INJECTION, SOLUTION INTRAVENOUS at 00:36

## 2024-09-06 NOTE — ED PROVIDER NOTES
[facility] EMERGENCY DEPARTMENT  EMERGENCY DEPARTMENT ENCOUNTER        Pt Name: Meera Starr  MRN: [id number]  Birthdate 1995  Date of evaluation: 9/5/2024  Provider: Leann Bal MD  PCP: Julien Roberson MD  Note Started: 11:06 PM EDT 9/5/24    CHIEF COMPLAINT     Worried I might have COVID again  HISTORY OF PRESENT ILLNESS: 1 or more Elements     Chief Complaint   Patient presents with    Concern For COVID-19     Pt presents to ED via self from home with a c/o concern for COVID d/t s/s including chest pain, SOB, cough, fever/chills, and body aches. Pt states she had COVID a few months ago and was seen here & admitted d/t SpO2 dropping to 83% while walking. Pt states she has pulse ox @ home and today SpO2 @ home read 86%.      History from : Patient  Limitations to history : None    Meera Starr is a 29 y.o. female who presents to the emergency department secondary to concern for COVID.  She reports that a few months ago she was admitted for hypoxia with COVID.  She states she was told to get a pulse ox to have at home and she has been feeling poorly for the last 2 days with congestion, body aches, chest heaviness.  Today she was feeling worse than she had been so she checked her oxygen level and it was low around 8687% at home.  She states that all the symptoms she has now are similar to the symptoms when she had COVID last time which made her worried.  She reports a cough for couple of days.  Not significantly productive.  Endorses fevers and chills.  Endorses generally feeling unwell.  At home she tried some Sudafed, no other medication, no significant improvement.    Past medical history noted below.  History of smoking.  Aside from what is stated above denies any other symptoms or modifying factors.    Nursing Notes were all reviewed and agreed with or any disagreements addressed in HPI/MDM.  REVIEW OF SYSTEMS :    Review of Systems Pertinent positive and negative findings as  management      I estimate there is low risk for sepsis, MI, stroke, tamponade, PTX, toxicity, or other life threatening etiology. Given the best available information and clinical assessment I estimate the risk of hospitalization to be greater than risk of treatment at home. We discussed and I explained the risk could rapidly change and return precautions and instructions given. Discharge disposition is reasonable.     I am the Primary Clinician of Record.  FINAL IMPRESSION      1. Shortness of breath          DISPOSITION/PLAN   DISPOSITION Decision To Discharge 09/06/2024 01:28:08 AM  Condition at Disposition: Stable      PATIENT REFERRED TO:  Julien Roberson MD  [address] [facility]  [address]  [facility] [id number] [phone]    Schedule an appointment as soon as possible for a visit   For follow up appointment      DISCHARGE MEDICATIONS:  New Prescriptions    BENZONATATE (TESSALON PERLES) 100 MG CAPSULE    Take 1-2 capsules by mouth 3 times daily as needed for Cough          (Please note that portions of this note were completed with a voice recognition program.  Efforts were made to edit the dictations but occasionally words are mis-transcribed.)    Leann Bal MD (electronically signed)  Attending Emergency Physician     Leann Bal MD  09/06/24 0144

## 2024-09-06 NOTE — ED TRIAGE NOTES
Pt presents to ED via self from home with a c/o concern for COVID d/t s/s including chest pain, SOB, cough, fever/chills, and body aches. Pt states she had COVID a few months ago and was seen here & admitted d/t SpO2 dropping to 83% while walking. Pt states she has pulse ox @ home and today SpO2 @ home read 86%. Pt rates chest pain 8/10; describes pain as heavy/tight; pain gets worse on inspiration. Pt states she been taking OTC Sudafed; last dose was 1600. VSS.

## 2024-09-06 NOTE — DISCHARGE INSTRUCTIONS
Your workup today included blood tests, CT scan, EKG, and a walk test.    Fortunately the results of your tests were reassuring.  No signs of an acute pneumonia, your COVID test was negative.      Your symptoms are most likely related to a viral illness.     It is important to protect others from your illness and one way to do that is to wear a mask around them. Take over the counter medications like NSAIDs, tylenol, sudafed for symptoms of body aches, congestion, fevers.     We have prescribed Tessalon Perles for cough.    Follow up with primary care next week.    Return to ED for any new or worsening symptoms or concerns.

## 2024-09-06 NOTE — ED NOTES
Pt ambulated with pulse ox at this time. Pt maintained SpO2 of % during ambulation. Pt tolerated well. No distress noted. Pt back to bed.

## 2024-10-10 ENCOUNTER — HOSPITAL ENCOUNTER (EMERGENCY)
Age: 29
Discharge: HOME | End: 2024-10-10
Payer: COMMERCIAL

## 2024-10-10 VITALS
RESPIRATION RATE: 16 BRPM | BODY MASS INDEX: 24.81 KG/M2 | TEMPERATURE: 97.7 F | DIASTOLIC BLOOD PRESSURE: 82 MMHG | HEART RATE: 72 BPM | OXYGEN SATURATION: 100 % | WEIGHT: 163.14 LBS | SYSTOLIC BLOOD PRESSURE: 124 MMHG

## 2024-10-10 DIAGNOSIS — T50.905A ADVERSE EFFECT OF DRUG, INITIAL ENCOUNTER: Primary | ICD-10-CM

## 2024-10-10 LAB
AMPHETAMINES UR QL SCN>1000 NG/ML: ABNORMAL
ANION GAP SERPL CALCULATED.3IONS-SCNC: 11 MMOL/L (ref 3–16)
BACTERIA URNS QL MICRO: NORMAL /HPF
BARBITURATES UR QL SCN>200 NG/ML: ABNORMAL
BASOPHILS # BLD: 0 K/UL (ref 0–0.2)
BASOPHILS NFR BLD: 1 %
BENZODIAZ UR QL SCN>200 NG/ML: ABNORMAL
BILIRUB UR QL STRIP.AUTO: NEGATIVE
BUN SERPL-MCNC: 9 MG/DL (ref 7–20)
CALCIUM SERPL-MCNC: 9.3 MG/DL (ref 8.3–10.6)
CANNABINOIDS UR QL SCN>50 NG/ML: ABNORMAL
CHLORIDE SERPL-SCNC: 102 MMOL/L (ref 99–110)
CLARITY UR: CLEAR
CO2 SERPL-SCNC: 25 MMOL/L (ref 21–32)
COCAINE UR QL SCN: POSITIVE
COLOR UR: YELLOW
CREAT SERPL-MCNC: 0.6 MG/DL (ref 0.6–1.1)
DEPRECATED RDW RBC AUTO: 14.3 % (ref 12.4–15.4)
DRUG SCREEN COMMENT UR-IMP: ABNORMAL
EKG ATRIAL RATE: 78 BPM
EKG DIAGNOSIS: NORMAL
EKG P AXIS: 52 DEGREES
EKG P-R INTERVAL: 146 MS
EKG Q-T INTERVAL: 390 MS
EKG QRS DURATION: 88 MS
EKG QTC CALCULATION (BAZETT): 444 MS
EKG R AXIS: 21 DEGREES
EKG T AXIS: 11 DEGREES
EKG VENTRICULAR RATE: 78 BPM
EOSINOPHIL # BLD: 0.1 K/UL (ref 0–0.6)
EOSINOPHIL NFR BLD: 1.6 %
EPI CELLS #/AREA URNS AUTO: 0 /HPF (ref 0–5)
FENTANYL SCREEN, URINE: ABNORMAL
GFR SERPLBLD CREATININE-BSD FMLA CKD-EPI: >90 ML/MIN/{1.73_M2}
GLUCOSE SERPL-MCNC: 87 MG/DL (ref 70–99)
GLUCOSE UR STRIP.AUTO-MCNC: NEGATIVE MG/DL
HCG UR QL: NEGATIVE
HCT VFR BLD AUTO: 38.8 % (ref 36–48)
HGB BLD-MCNC: 13.5 G/DL (ref 12–16)
HGB UR QL STRIP.AUTO: ABNORMAL
HYALINE CASTS #/AREA URNS AUTO: 0 /LPF (ref 0–8)
KETONES UR STRIP.AUTO-MCNC: NEGATIVE MG/DL
LEUKOCYTE ESTERASE UR QL STRIP.AUTO: NEGATIVE
LYMPHOCYTES # BLD: 1.2 K/UL (ref 1–5.1)
LYMPHOCYTES NFR BLD: 26.5 %
MCH RBC QN AUTO: 31.8 PG (ref 26–34)
MCHC RBC AUTO-ENTMCNC: 34.9 G/DL (ref 31–36)
MCV RBC AUTO: 91 FL (ref 80–100)
METHADONE UR QL SCN>300 NG/ML: ABNORMAL
MONOCYTES # BLD: 0.4 K/UL (ref 0–1.3)
MONOCYTES NFR BLD: 7.7 %
NEUTROPHILS # BLD: 3 K/UL (ref 1.7–7.7)
NEUTROPHILS NFR BLD: 63.2 %
NITRITE UR QL STRIP.AUTO: NEGATIVE
OPIATES UR QL SCN>300 NG/ML: ABNORMAL
OXYCODONE UR QL SCN: ABNORMAL
PCP UR QL SCN>25 NG/ML: ABNORMAL
PH UR STRIP.AUTO: 7 [PH] (ref 5–8)
PH UR STRIP: 6 [PH]
PLATELET # BLD AUTO: 286 K/UL (ref 135–450)
PMV BLD AUTO: 8.1 FL (ref 5–10.5)
POTASSIUM SERPL-SCNC: 3.8 MMOL/L (ref 3.5–5.1)
PROT UR STRIP.AUTO-MCNC: NEGATIVE MG/DL
RBC # BLD AUTO: 4.26 M/UL (ref 4–5.2)
RBC CLUMPS #/AREA URNS AUTO: 1 /HPF (ref 0–4)
SODIUM SERPL-SCNC: 138 MMOL/L (ref 136–145)
SP GR UR STRIP.AUTO: 1.01 (ref 1–1.03)
TROPONIN, HIGH SENSITIVITY: <6 NG/L (ref 0–14)
UA COMPLETE W REFLEX CULTURE PNL UR: ABNORMAL
UA DIPSTICK W REFLEX MICRO PNL UR: YES
URN SPEC COLLECT METH UR: ABNORMAL
UROBILINOGEN UR STRIP-ACNC: 0.2 E.U./DL
WBC # BLD AUTO: 4.7 K/UL (ref 4–11)
WBC #/AREA URNS AUTO: 0 /HPF (ref 0–5)

## 2024-10-10 PROCEDURE — 93005 ELECTROCARDIOGRAM TRACING: CPT | Performed by: PHYSICIAN ASSISTANT

## 2024-10-10 PROCEDURE — 99284 EMERGENCY DEPT VISIT MOD MDM: CPT

## 2024-10-10 PROCEDURE — 80048 BASIC METABOLIC PNL TOTAL CA: CPT

## 2024-10-10 PROCEDURE — 81001 URINALYSIS AUTO W/SCOPE: CPT

## 2024-10-10 PROCEDURE — 93010 ELECTROCARDIOGRAM REPORT: CPT | Performed by: INTERNAL MEDICINE

## 2024-10-10 PROCEDURE — 85025 COMPLETE CBC W/AUTO DIFF WBC: CPT

## 2024-10-10 PROCEDURE — 84703 CHORIONIC GONADOTROPIN ASSAY: CPT

## 2024-10-10 PROCEDURE — 84484 ASSAY OF TROPONIN QUANT: CPT

## 2024-10-10 PROCEDURE — 80307 DRUG TEST PRSMV CHEM ANLYZR: CPT

## 2024-10-10 PROCEDURE — 6370000000 HC RX 637 (ALT 250 FOR IP): Performed by: PHYSICIAN ASSISTANT

## 2024-10-10 RX ORDER — LORAZEPAM 1 MG/1
1 TABLET ORAL ONCE
Status: COMPLETED | OUTPATIENT
Start: 2024-10-10 | End: 2024-10-10

## 2024-10-10 RX ADMIN — LORAZEPAM 1 MG: 1 TABLET ORAL at 12:16

## 2024-10-10 ASSESSMENT — PAIN DESCRIPTION - LOCATION: LOCATION: CHEST

## 2024-10-10 ASSESSMENT — PAIN DESCRIPTION - ONSET: ONSET: ON-GOING

## 2024-10-10 ASSESSMENT — PAIN SCALES - GENERAL: PAINLEVEL_OUTOF10: 7

## 2024-10-10 ASSESSMENT — PAIN - FUNCTIONAL ASSESSMENT
PAIN_FUNCTIONAL_ASSESSMENT: ACTIVITIES ARE NOT PREVENTED
PAIN_FUNCTIONAL_ASSESSMENT: 0-10

## 2024-10-10 ASSESSMENT — PAIN DESCRIPTION - FREQUENCY: FREQUENCY: CONTINUOUS

## 2024-10-10 NOTE — ED PROVIDER NOTES
EKG Interpretation #1    Interpreted by emergency department physician  I did not participate in the care of this patient.  I only interpreted the EKG.  Time performed: 1218  Time read: 1222    Rhythm: Sinus  Ventricular Rate: 78  QRS Axis: 21  Ectopy: None  Conduction: Normal sinus rhythm with T wave inversions noted in lead III only.  This is otherwise a normal EKG  ST Segments: normal  T Waves: Inverted lead III only  Q Waves: None noted    Other findings: Motion artifact but EKG is readable    Compared to EKG on: 9/5/2024 and appears unchanged.    Clinical Impression: Normal sinus rhythm with T wave versions in lead III only.  This is otherwise a normal EKG.  There is motion artifact but EKG is readable.  This is compared to an EKG on 9/5/2024 but appears unchanged.    DO Raffi ORR Charles K, DO  10/10/24 1401    
AORTIC DISSECTION, HEMORRHAGE, CRITICAL CARDIAC ARRHYTHMIA, SUICIDAL BEHAVIOR, HOMICIDAL BEHAVIOR, PSYCHOSIS, DANGEROUS OR VIOLENT BEHAVIOR, DISORIENTATION, OR MENTAL HEALTH CONDITION REQUIRING HOSPITALIZATION, thus I consider the discharge disposition reasonable.     CRITICAL CARE TIME   None.    FINAL IMPRESSION      1. Adverse effect of drug, initial encounter          DISPOSITION/PLAN   DISPOSITION Decision To Discharge 10/10/2024 02:17:30 PM  Condition at Disposition: Data Unavailable      PATIENT REFERRED TO:  Julien Roberson MD  [address] [facility]  [address]  [place] [id number] [phone]    Call   as needed, for follow-up care      DISCHARGE MEDICATIONS:  New Prescriptions    No medications on file       DISCONTINUED MEDICATIONS:  Discontinued Medications    No medications on file            (Please note that portions of this note were completed with a voice recognition program.  Efforts were made to edit the dictations but occasionally words are mis-transcribed.)    ALEXIA Chow (electronically signed)        Yung Short PA  10/10/24 8714    
Detail Level: Generalized
Detail Level: Detailed

## 2025-03-21 ENCOUNTER — HOSPITAL ENCOUNTER (EMERGENCY)
Age: 30
Discharge: HOME | End: 2025-03-22
Payer: COMMERCIAL

## 2025-03-21 ENCOUNTER — APPOINTMENT (OUTPATIENT)
Dept: CT IMAGING | Age: 30
End: 2025-03-21
Payer: COMMERCIAL

## 2025-03-21 DIAGNOSIS — R10.9 FLANK PAIN: Primary | ICD-10-CM

## 2025-03-21 DIAGNOSIS — R19.7 DIARRHEA, UNSPECIFIED TYPE: ICD-10-CM

## 2025-03-21 LAB
ALBUMIN SERPL-MCNC: 5.2 G/DL (ref 3.4–5)
ALBUMIN/GLOB SERPL: 1.4 {RATIO} (ref 1.1–2.2)
ALP SERPL-CCNC: 107 U/L (ref 40–129)
ALT SERPL-CCNC: 29 U/L (ref 10–40)
ANION GAP SERPL CALCULATED.3IONS-SCNC: 17 MMOL/L (ref 3–16)
AST SERPL-CCNC: 43 U/L (ref 15–37)
BACTERIA URNS QL MICRO: ABNORMAL /HPF
BASOPHILS # BLD: 0.1 K/UL (ref 0–0.2)
BASOPHILS NFR BLD: 0.8 %
BILIRUB SERPL-MCNC: <0.2 MG/DL (ref 0–1)
BILIRUB UR QL STRIP.AUTO: ABNORMAL
BUN SERPL-MCNC: 14 MG/DL (ref 7–20)
CALCIUM SERPL-MCNC: 10.3 MG/DL (ref 8.3–10.6)
CHARACTER UR: ABNORMAL
CHLORIDE SERPL-SCNC: 100 MMOL/L (ref 99–110)
CLARITY UR: ABNORMAL
CO2 SERPL-SCNC: 20 MMOL/L (ref 21–32)
COLOR UR: YELLOW
CREAT SERPL-MCNC: 0.9 MG/DL (ref 0.6–1.1)
CRYSTALS URNS MICRO: ABNORMAL /HPF
DEPRECATED RDW RBC AUTO: 13.6 % (ref 12.4–15.4)
EOSINOPHIL # BLD: 0.2 K/UL (ref 0–0.6)
EOSINOPHIL NFR BLD: 1.5 %
EPI CELLS #/AREA URNS AUTO: 13 /HPF (ref 0–5)
FINE GRAN CASTS #/AREA URNS HPF: ABNORMAL /LPF (ref 0–2)
GFR SERPLBLD CREATININE-BSD FMLA CKD-EPI: 88 ML/MIN/{1.73_M2}
GLUCOSE SERPL-MCNC: 150 MG/DL (ref 70–99)
GLUCOSE UR STRIP.AUTO-MCNC: NEGATIVE MG/DL
HCG SERPL QL: NEGATIVE
HCT VFR BLD AUTO: 47.5 % (ref 36–48)
HGB BLD-MCNC: 16.2 G/DL (ref 12–16)
HGB UR QL STRIP.AUTO: NEGATIVE
HYALINE CASTS #/AREA URNS AUTO: 23 /LPF (ref 0–8)
KETONES UR STRIP.AUTO-MCNC: NEGATIVE MG/DL
LACTATE BLDV-SCNC: 1.4 MMOL/L (ref 0.4–2)
LEUKOCYTE ESTERASE UR QL STRIP.AUTO: NEGATIVE
LYMPHOCYTES # BLD: 0.7 K/UL (ref 1–5.1)
LYMPHOCYTES NFR BLD: 4.8 %
MCH RBC QN AUTO: 31.9 PG (ref 26–34)
MCHC RBC AUTO-ENTMCNC: 34.1 G/DL (ref 31–36)
MCV RBC AUTO: 93.3 FL (ref 80–100)
MONOCYTES # BLD: 0.9 K/UL (ref 0–1.3)
MONOCYTES NFR BLD: 5.9 %
NEUTROPHILS # BLD: 13 K/UL (ref 1.7–7.7)
NEUTROPHILS NFR BLD: 87 %
NITRITE UR QL STRIP.AUTO: NEGATIVE
PH UR STRIP.AUTO: 6 [PH] (ref 5–8)
PLATELET # BLD AUTO: 353 K/UL (ref 135–450)
PMV BLD AUTO: 8.1 FL (ref 5–10.5)
POTASSIUM SERPL-SCNC: 4.7 MMOL/L (ref 3.5–5.1)
PROT SERPL-MCNC: 8.9 G/DL (ref 6.4–8.2)
PROT UR STRIP.AUTO-MCNC: 100 MG/DL
RBC # BLD AUTO: 5.09 M/UL (ref 4–5.2)
RBC #/AREA URNS HPF: ABNORMAL /HPF (ref 0–4)
SODIUM SERPL-SCNC: 137 MMOL/L (ref 136–145)
SP GR UR STRIP.AUTO: >=1.03 (ref 1–1.03)
UA COMPLETE W REFLEX CULTURE PNL UR: ABNORMAL
UA DIPSTICK W REFLEX MICRO PNL UR: YES
URN SPEC COLLECT METH UR: ABNORMAL
UROBILINOGEN UR STRIP-ACNC: 0.2 E.U./DL
WBC # BLD AUTO: 14.9 K/UL (ref 4–11)
WBC #/AREA URNS HPF: ABNORMAL /HPF (ref 0–5)

## 2025-03-21 PROCEDURE — 99285 EMERGENCY DEPT VISIT HI MDM: CPT

## 2025-03-21 PROCEDURE — 6360000002 HC RX W HCPCS

## 2025-03-21 PROCEDURE — 96374 THER/PROPH/DIAG INJ IV PUSH: CPT

## 2025-03-21 PROCEDURE — 80053 COMPREHEN METABOLIC PANEL: CPT

## 2025-03-21 PROCEDURE — 2580000003 HC RX 258

## 2025-03-21 PROCEDURE — 83605 ASSAY OF LACTIC ACID: CPT

## 2025-03-21 PROCEDURE — 85025 COMPLETE CBC W/AUTO DIFF WBC: CPT

## 2025-03-21 PROCEDURE — 84703 CHORIONIC GONADOTROPIN ASSAY: CPT

## 2025-03-21 PROCEDURE — 6360000004 HC RX CONTRAST MEDICATION

## 2025-03-21 PROCEDURE — 96375 TX/PRO/DX INJ NEW DRUG ADDON: CPT

## 2025-03-21 PROCEDURE — 74177 CT ABD & PELVIS W/CONTRAST: CPT

## 2025-03-21 PROCEDURE — 96376 TX/PRO/DX INJ SAME DRUG ADON: CPT

## 2025-03-21 PROCEDURE — 81001 URINALYSIS AUTO W/SCOPE: CPT

## 2025-03-21 RX ORDER — ONDANSETRON 2 MG/ML
4 INJECTION INTRAMUSCULAR; INTRAVENOUS ONCE
Status: COMPLETED | OUTPATIENT
Start: 2025-03-21 | End: 2025-03-21

## 2025-03-21 RX ORDER — MORPHINE SULFATE 2 MG/ML
2 INJECTION, SOLUTION INTRAMUSCULAR; INTRAVENOUS ONCE
Refills: 0 | Status: COMPLETED | OUTPATIENT
Start: 2025-03-21 | End: 2025-03-21

## 2025-03-21 RX ORDER — 0.9 % SODIUM CHLORIDE 0.9 %
500 INTRAVENOUS SOLUTION INTRAVENOUS ONCE
Status: COMPLETED | OUTPATIENT
Start: 2025-03-21 | End: 2025-03-22

## 2025-03-21 RX ORDER — IOPAMIDOL 755 MG/ML
75 INJECTION, SOLUTION INTRAVASCULAR
Status: COMPLETED | OUTPATIENT
Start: 2025-03-21 | End: 2025-03-21

## 2025-03-21 RX ORDER — 0.9 % SODIUM CHLORIDE 0.9 %
1000 INTRAVENOUS SOLUTION INTRAVENOUS ONCE
Status: COMPLETED | OUTPATIENT
Start: 2025-03-21 | End: 2025-03-21

## 2025-03-21 RX ADMIN — MORPHINE SULFATE 2 MG: 2 INJECTION, SOLUTION INTRAMUSCULAR; INTRAVENOUS at 23:43

## 2025-03-21 RX ADMIN — ONDANSETRON 4 MG: 2 INJECTION, SOLUTION INTRAMUSCULAR; INTRAVENOUS at 21:02

## 2025-03-21 RX ADMIN — IOPAMIDOL 75 ML: 755 INJECTION, SOLUTION INTRAVENOUS at 23:10

## 2025-03-21 RX ADMIN — MORPHINE SULFATE 2 MG: 2 INJECTION, SOLUTION INTRAMUSCULAR; INTRAVENOUS at 21:01

## 2025-03-21 RX ADMIN — SODIUM CHLORIDE 500 ML: 9 INJECTION, SOLUTION INTRAVENOUS at 23:47

## 2025-03-21 RX ADMIN — SODIUM CHLORIDE 1000 ML: 0.9 INJECTION, SOLUTION INTRAVENOUS at 21:04

## 2025-03-21 ASSESSMENT — PAIN DESCRIPTION - FREQUENCY: FREQUENCY: CONTINUOUS

## 2025-03-21 ASSESSMENT — PAIN SCALES - GENERAL
PAINLEVEL_OUTOF10: 10
PAINLEVEL_OUTOF10: 10
PAINLEVEL_OUTOF10: 2
PAINLEVEL_OUTOF10: 7

## 2025-03-21 ASSESSMENT — PAIN DESCRIPTION - DESCRIPTORS
DESCRIPTORS: ACHING

## 2025-03-21 ASSESSMENT — PAIN DESCRIPTION - LOCATION
LOCATION: FLANK

## 2025-03-21 ASSESSMENT — PAIN DESCRIPTION - ORIENTATION
ORIENTATION: LEFT
ORIENTATION: LEFT
ORIENTATION: RIGHT
ORIENTATION: LEFT

## 2025-03-21 ASSESSMENT — PAIN DESCRIPTION - PAIN TYPE: TYPE: ACUTE PAIN

## 2025-03-22 ENCOUNTER — RESULTS FOLLOW-UP (OUTPATIENT)
Dept: EMERGENCY DEPT | Age: 30
End: 2025-03-22

## 2025-03-22 VITALS
OXYGEN SATURATION: 97 % | DIASTOLIC BLOOD PRESSURE: 74 MMHG | BODY MASS INDEX: 25.43 KG/M2 | HEART RATE: 66 BPM | SYSTOLIC BLOOD PRESSURE: 124 MMHG | RESPIRATION RATE: 15 BRPM | HEIGHT: 68 IN | WEIGHT: 167.77 LBS | TEMPERATURE: 97.5 F

## 2025-03-22 LAB
C DIFF TOX A+B STL QL IA: NORMAL
GI PATHOGENS PNL STL NAA+PROBE: NORMAL

## 2025-03-22 PROCEDURE — 87425 ROTAVIRUS AG IA: CPT

## 2025-03-22 PROCEDURE — 87506 IADNA-DNA/RNA PROBE TQ 6-11: CPT

## 2025-03-22 PROCEDURE — 87449 NOS EACH ORGANISM AG IA: CPT

## 2025-03-22 PROCEDURE — 87324 CLOSTRIDIUM AG IA: CPT

## 2025-03-22 PROCEDURE — 82705 FATS/LIPIDS FECES QUAL: CPT

## 2025-03-22 RX ORDER — DICYCLOMINE HYDROCHLORIDE 10 MG/1
10 CAPSULE ORAL
Qty: 120 CAPSULE | Refills: 0 | Status: SHIPPED | OUTPATIENT
Start: 2025-03-22

## 2025-03-22 RX ORDER — ONDANSETRON 4 MG/1
4 TABLET, FILM COATED ORAL EVERY 8 HOURS PRN
Qty: 20 TABLET | Refills: 0 | Status: SHIPPED | OUTPATIENT
Start: 2025-03-22

## 2025-03-22 ASSESSMENT — PAIN SCALES - GENERAL: PAINLEVEL_OUTOF10: 0

## 2025-03-22 NOTE — ED TRIAGE NOTES
.Meera Starr is a 29 y.o. female brought herself to the ER for eval of left sided flank pain that started a couple of hours ago. The patient states that the pain is a pressure pain that is a 10/10. The patient states that she has been vomiting since the pain and had an episode of liquid diarrhea. The patient is guarded and crying in triage. The patient is alert and oriented with an open and patent airway.

## 2025-03-22 NOTE — DISCHARGE INSTRUCTIONS
Please follow-up with your PCP as soon as possible for assessment after the visit    Bentyl and Zofran were prescribed, please return experience new or worsening symptoms

## 2025-03-22 NOTE — ED PROVIDER NOTES
(*)     Anion Gap 17 (*)     Glucose 150 (*)     Total Protein 8.9 (*)     Albumin 5.2 (*)     AST 43 (*)     All other components within normal limits   URINALYSIS WITH REFLEX TO CULTURE - Abnormal; Notable for the following components:    Clarity, UA SL CLOUDY (*)     Bilirubin, Urine MODERATE (*)     Protein,  (*)     All other components within normal limits   MICROSCOPIC URINALYSIS - Abnormal; Notable for the following components:    Bacteria, UA 1+ (*)     Crystals, UA Few Other (*)     Hyaline Casts, UA 23 (*)     Epithelial Cells, UA 13 (*)     All other components within normal limits   GASTROINTESTINAL PANEL, MOLECULAR    Narrative:     ORDER#: [id number]                          ORDERED BY: YOVANNY PARK  SOURCE: Stool                              COLLECTED:  03/22/25 00:57  ANTIBIOTICS AT ANTONIO.:                      RECEIVED :  03/22/25 00:57   C DIFF TOXIN/ANTIGEN    Narrative:     ORDER#: [id number]                          ORDERED BY: YOVANNY PARK  SOURCE: Stool                              COLLECTED:  03/22/25 00:57  ANTIBIOTICS AT ANTONIO.:                      RECEIVED :  03/22/25 00:57  Collect White vial (sterile container)   GIARDIA ANTIGEN   HCG, SERUM, QUALITATIVE   LACTIC ACID   ROTAVIRUS ANTIGEN, STOOL   FECAL FAT, QUALITATIVE       When ordered only abnormal lab results are displayed. All other labs were within normal range or not returned as of this dictation.    EKG: When ordered, EKG's are interpreted by the Emergency Department Physician in the absence of a cardiologist.  Please see their note for interpretation of EKG.    RADIOLOGY:   Non-plain film images such as CT, Ultrasound and MRI are read by the radiologist.     X-Ray Independently interpreted by me:     Interpretation per the Radiologist below, if available at the time of this note:    CT ABDOMEN PELVIS W IV CONTRAST Additional Contrast? None   Final Result   Findings compatible with a diarrheal process.  Otherwise,  discussed with the patient to immediately return to the ED if she experiences new or worsening symptoms, patient verbalizes understanding    Patient discharged home in stable condition, no acute distress, the airway was not compromised, non-tachycardic, afebrile, and able to ambulate in the ED.    I estimate there is LOW risk for ACUTE APPENDICITIS, BOWEL OBSTRUCTION, CHOLECYSTITIS, DIVERTICULITIS, INCARCERATED HERNIA, PANCREATITIS, PERITONITIS, PELVIC INFLAMMATORY DISEASE, OVARIAN TORSION, PERFORATED BOWEL, BOWEL ISCHEMIA, ECTOPIC PREGNANCY, TUBO-OVARIAN ABSCESS, or SEPSIS, thus I consider the discharge disposition reasonable.       I am the Primary Clinician of Record.  FINAL IMPRESSION      1. Flank pain    2. Diarrhea, unspecified type          DISPOSITION/PLAN     DISPOSITION Decision To Discharge 03/22/2025 01:03:09 AM   DISPOSITION CONDITION Stable           PATIENT REFERRED TO:  Julien Roberson MD  [address]  [address]  [facility] [address]  [phone]    Schedule an appointment as soon as possible for a visit in 3 days      [facility]  [address] [facility] [address]  [phone]    If symptoms worsen      DISCHARGE MEDICATIONS:  Discharge Medication List as of 3/22/2025  1:23 AM        START taking these medications    Details   dicyclomine (BENTYL) 10 MG capsule Take 1 capsule by mouth 4 times daily (before meals and nightly), Disp-120 capsule, R-0Normal      ondansetron (ZOFRAN) 4 MG tablet Take 1 tablet by mouth every 8 hours as needed for Nausea, Disp-20 tablet, R-0Normal             DISCONTINUED MEDICATIONS:  Discharge Medication List as of 3/22/2025  1:23 AM                 (Please note that portions of this note were completed with a voice recognition program.  Efforts were made to edit the dictations but occasionally words are mis-transcribed.)    Whit Mchugh PA-C (electronically signed)       Whit Mchugh PA-C  03/22/25 3688

## 2025-03-23 LAB
FAT STL QL: NORMAL
G LAMBLIA AG STL QL IA: NORMAL
NEUTRAL FAT STL QL: NORMAL
RV AG STL QL IA: NEGATIVE

## 2025-05-14 ENCOUNTER — APPOINTMENT (OUTPATIENT)
Dept: GENERAL RADIOLOGY | Age: 30
End: 2025-05-14
Payer: COMMERCIAL

## 2025-05-14 ENCOUNTER — HOSPITAL ENCOUNTER (EMERGENCY)
Age: 30
Discharge: HOME | End: 2025-05-14
Attending: EMERGENCY MEDICINE
Payer: COMMERCIAL

## 2025-05-14 VITALS
TEMPERATURE: 98.8 F | SYSTOLIC BLOOD PRESSURE: 135 MMHG | HEIGHT: 68 IN | OXYGEN SATURATION: 98 % | HEART RATE: 86 BPM | RESPIRATION RATE: 22 BRPM | BODY MASS INDEX: 25.26 KG/M2 | DIASTOLIC BLOOD PRESSURE: 90 MMHG | WEIGHT: 166.67 LBS
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
## 2025-05-14 PROCEDURE — 6370000000 HC RX 637 (ALT 250 FOR IP)

## 2025-05-14 PROCEDURE — 12001 RPR S/N/AX/GEN/TRNK 2.5CM/<: CPT

## 2025-05-14 PROCEDURE — 73090 X-RAY EXAM OF FOREARM: CPT

## 2025-05-14 PROCEDURE — 99283 EMERGENCY DEPT VISIT LOW MDM: CPT

## 2025-05-14 RX ORDER — OXYCODONE AND ACETAMINOPHEN 5; 325 MG/1; MG/1
1 TABLET ORAL ONCE
Refills: 0 | Status: COMPLETED | OUTPATIENT
Start: 2025-05-14 | End: 2025-05-14

## 2025-05-14 RX ORDER — IBUPROFEN 600 MG/1
600 TABLET, FILM COATED ORAL ONCE
Status: COMPLETED | OUTPATIENT
Start: 2025-05-14 | End: 2025-05-14

## 2025-05-14 RX ADMIN — OXYCODONE AND ACETAMINOPHEN 1 TABLET: 5; 325 TABLET ORAL at 19:46

## 2025-05-14 RX ADMIN — AMOXICILLIN AND CLAVULANATE POTASSIUM 1 TABLET: 875; 125 TABLET, FILM COATED ORAL at 22:03

## 2025-05-14 RX ADMIN — IBUPROFEN 600 MG: 600 TABLET, FILM COATED ORAL at 19:46

## 2025-05-14 ASSESSMENT — PAIN - FUNCTIONAL ASSESSMENT
PAIN_FUNCTIONAL_ASSESSMENT: 0-10
PAIN_FUNCTIONAL_ASSESSMENT: ACTIVITIES ARE NOT PREVENTED

## 2025-05-14 ASSESSMENT — PAIN DESCRIPTION - FREQUENCY: FREQUENCY: CONTINUOUS

## 2025-05-14 ASSESSMENT — PAIN DESCRIPTION - ONSET: ONSET: SUDDEN

## 2025-05-14 ASSESSMENT — PAIN DESCRIPTION - PAIN TYPE: TYPE: ACUTE PAIN

## 2025-05-14 ASSESSMENT — PAIN SCALES - GENERAL: PAINLEVEL_OUTOF10: 10

## 2025-05-14 ASSESSMENT — PAIN DESCRIPTION - ORIENTATION: ORIENTATION: RIGHT

## 2025-05-14 ASSESSMENT — PAIN DESCRIPTION - DESCRIPTORS: DESCRIPTORS: THROBBING;CRUSHING

## 2025-05-14 ASSESSMENT — PAIN DESCRIPTION - LOCATION: LOCATION: ARM

## 2025-05-14 NOTE — ED TRIAGE NOTES
Pt presents to ED with a c/o dog bite to right forearm that occurred about 30 minutes ago. Pt states she was trying to break her and her neighbor's dog up when neighbor's dog bit her forearm. Pt states she thinks the dog is up-to-date on vaccines/immunizations. Pt states last tetanus shot was updated in 2021.

## 2025-05-15 NOTE — ED PROVIDER NOTES
on the ulnar aspect of her distal forearm sustained from a dog bite.  Patient neurovascularly intact on neuroexam however complaining of 10 out of 10 pain given dose of Percocet and Motrin to aid in ED workup.  X-ray of radius and ulna obtained which showed no acute bony abnormalities.  After thorough evaluation the wound was copiously irrigated to reduce the risk of infection and the edges were reapproximated using 2 sutures.  Patient given a dose of Augmentin in the department.  The decision was made to discharge her home with the remaining course of antibiotics and specific instructions for wound care.  The patient was advised to follow-up with a hand surgeon for further evaluation and management.  Patient comfortable and in agreement with discharge plan all questions satisfactorily answered.  Patient discharged home in stable condition in care of her significant other.    Amount and/or Complexity of Data Reviewed  Radiology: ordered.    Risk  Prescription drug management.            REASSESSMENT     ED Course as of 05/15/25 0346   Wed May 14, 2025   2101 Upon reassessment, patient notes that her pain is improved status post application of local anesthetic and p.o. pain medication.  She remains hemodynamically stable and afebrile.  She reports no new or worsening symptoms.  Discussed with patient my recommendations for placing sutures.  Patient comfortable and in agreement with plan [ML]      ED Course User Index  [ML] Carlos Richards PA-C         CRITICAL CARE TIME   Total Critical Care time was 0 minutes, excluding separately reportable procedures.  There was a high probability of clinically significant/life threatening deterioration in the patient's condition which required my urgent intervention.  0    CONSULTS:  None    PROCEDURES:  Unless otherwise noted below, none     Lac Repair    Date/Time: 5/15/2025 4:07 AM    Performed by: Carlos Richards PA-C  Authorized by: Rosalba Ledezma MD    Consent:     Consent

## 2025-05-15 NOTE — DISCHARGE INSTRUCTIONS
Please use over-the-counter NSAIDs and Tylenol for pain management.  Please keep wound clean and dry.  Please come back for reevaluation in case of increased pain, redness, swelling around the wound, pus or drainage from the wound, fever or chills, any unusual odor coming from the wound.

## 2025-05-15 NOTE — ED NOTES
Provider order placed for patient's discharge. Provider reviewed decision to discharge with the patient. Discharge paperwork and any prescriptions were reviewed with the patient. Patient verbalized understanding of discharge education and any prescriptions and has no further questions or further needs at this time. Patient left with all personal belongings and was stable upon departure. Patient thanked for choosing [facility] and informed to return should any need arise.

## 2025-05-20 ENCOUNTER — TELEPHONE (OUTPATIENT)
Dept: ORTHOPEDIC SURGERY | Age: 30
End: 2025-05-20

## 2025-05-20 NOTE — TELEPHONE ENCOUNTER
Left  for patient to return call to the office to schedule an appointment with RASTA Araya, THEO Lloyd, or PERCY Araya. Referred from ER.

## 2025-05-22 ENCOUNTER — OFFICE VISIT (OUTPATIENT)
Age: 30
End: 2025-05-22

## 2025-05-22 VITALS
BODY MASS INDEX: 25.34 KG/M2 | DIASTOLIC BLOOD PRESSURE: 81 MMHG | OXYGEN SATURATION: 98 % | SYSTOLIC BLOOD PRESSURE: 123 MMHG | HEIGHT: 68 IN | HEART RATE: 74 BPM | TEMPERATURE: 97.6 F

## 2025-05-22 DIAGNOSIS — Z48.02 VISIT FOR SUTURE REMOVAL: Primary | ICD-10-CM

## 2025-05-22 NOTE — PROGRESS NOTES
Meera Starr (:  1995) is a 29 y.o. female,New patient, here for evaluation of the following chief complaint(s):  Suture / Staple Removal (Stitch removal on right forearm from may 14th)      Assessment & Plan :         Follow up in PRN days if symptoms persist or if symptoms worsen.       Subjective :    Suture / Staple Removal         29 y.o. female presents with suture removal   Consented for procedure.       Vitals:    25 1628   BP: 123/81   BP Site: Left Upper Arm   Patient Position: Sitting   BP Cuff Size: Medium Adult   Pulse: 74   Temp: 97.6 °F (36.4 °C)   TempSrc: Temporal   SpO2: 98%   Height: 1.727 m (5' 8\")       No results found for this visit on 25.      Objective   Physical Exam      SUTURE REMOVAL    Date/Time: 2025 4:46 PM    Performed by: Mir Garcia APRN - CNP  Authorized by: Mir Garcia APRN - CNP    Consent:     Consent obtained:  Verbal    Consent given by:  Patient    Risks, benefits, and alternatives were discussed: yes      Risks discussed:  Bleeding, pain and wound separation  Universal protocol:     Procedure explained and questions answered to patient or proxy's satisfaction: yes      Relevant documents present and verified: no      Test results available: no      Imaging studies available: no      Required blood products, implants, devices, and special equipment available: no      Site/side marked: no      Immediately prior to procedure, a time out was called: no      Patient identity confirmed:  Verbally with patient  Location:     Location:  Upper extremity    Upper extremity location:  Arm    Arm location:  R upper arm  Procedure details:     Wound appearance:  No signs of infection    Number of sutures removed:  2  Post-procedure details:     Procedure completion:  Tolerated          An electronic signature was used to authenticate this note.    SERENA Curtis CNP

## 2025-06-09 ENCOUNTER — OFFICE VISIT (OUTPATIENT)
Dept: ORTHOPEDIC SURGERY | Age: 30
End: 2025-06-09
Payer: COMMERCIAL

## 2025-06-09 VITALS — BODY MASS INDEX: 25.16 KG/M2 | RESPIRATION RATE: 14 BRPM | WEIGHT: 166 LBS | HEIGHT: 68 IN

## 2025-06-09 DIAGNOSIS — M79.644 PAIN OF RIGHT THUMB: Primary | ICD-10-CM

## 2025-06-09 PROCEDURE — G8419 CALC BMI OUT NRM PARAM NOF/U: HCPCS | Performed by: PHYSICIAN ASSISTANT

## 2025-06-09 PROCEDURE — 4004F PT TOBACCO SCREEN RCVD TLK: CPT | Performed by: PHYSICIAN ASSISTANT

## 2025-06-09 PROCEDURE — 99203 OFFICE O/P NEW LOW 30 MIN: CPT | Performed by: PHYSICIAN ASSISTANT

## 2025-06-09 PROCEDURE — G8427 DOCREV CUR MEDS BY ELIG CLIN: HCPCS | Performed by: PHYSICIAN ASSISTANT

## 2025-06-09 PROCEDURE — L3809 WHFO W/O JOINTS PRE OTS: HCPCS | Performed by: PHYSICIAN ASSISTANT

## 2025-06-09 NOTE — PROGRESS NOTES
Ms. Meera Starr is a 29 y.o. right handed woman  who is seen today in Hand Surgical Consultation at the request of Julien Roberson MD.    She is seen today regarding an injury occurring on May 14th, 2025.  She reports having sustained a bite wound to the Ulnar aspect of the  right forearm when she was bit by a dog while trying to break up a dog fight.  At the time of injury, there was substantial bleeding & there was not acute sensory loss.  She was seen for Emergency evaluation elsewhere, radiographs were obtained.  By report, her skin injury was not repaired after the wound was thoroughly cleansed.  The nail structures were not involved in the injury and did not require repair at the time.  She reports mild pain located in the Ulnar aspect of the forearm, no tenderness of the wrist or elbow.  She notes today, no numbness in the Ulnar Nerve distribution of the Whole Hand. Symptoms are improving over time.      She is also seen today regarding an injury occurring on June 8th, 2025.  She reports injuring her right thumb having Tripped  and fallen.    At the time of injury, there was not clear dislocation or malposition of the wrist.  She was not seen for Emergency evaluation elsewhere, radiographs were not obtained & she has not been immobilized.  By report, there  was not an associated skin injury.  She reports moderate pain located in the Radial and Volar aspect of the wrist, no tenderness of the remaining fingers, hand, or elbow.  She notes today, no neurologic symptoms in the Whole Hand. Symptoms show no change over time.      I have today reviewed with Meera Starr the clinically relevant, past medical history, medications, allergies,  family history, social history, and Review Of Systems & I have documented any details relevant to today's presenting complaints in my history above.  Ms. Meera Starr's self-reported past medical history, medications, allergies,  family history, social history, and Review Of

## 2025-07-09 ENCOUNTER — OFFICE VISIT (OUTPATIENT)
Age: 30
End: 2025-07-09

## 2025-07-09 VITALS
DIASTOLIC BLOOD PRESSURE: 76 MMHG | BODY MASS INDEX: 24.4 KG/M2 | TEMPERATURE: 99 F | WEIGHT: 161 LBS | OXYGEN SATURATION: 95 % | HEART RATE: 86 BPM | HEIGHT: 68 IN | SYSTOLIC BLOOD PRESSURE: 120 MMHG

## 2025-07-09 DIAGNOSIS — L30.9 ECZEMA, UNSPECIFIED TYPE: ICD-10-CM

## 2025-07-09 DIAGNOSIS — B96.89 ACUTE BACTERIAL SINUSITIS: Primary | ICD-10-CM

## 2025-07-09 DIAGNOSIS — J01.90 ACUTE BACTERIAL SINUSITIS: Primary | ICD-10-CM

## 2025-07-09 RX ORDER — TRIAMCINOLONE ACETONIDE 0.25 MG/G
CREAM TOPICAL
Qty: 15 G | Refills: 0 | Status: SHIPPED | OUTPATIENT
Start: 2025-07-09

## 2025-07-09 RX ORDER — FLUCONAZOLE 150 MG/1
150 TABLET ORAL
Qty: 2 TABLET | Refills: 0 | Status: SHIPPED | OUTPATIENT
Start: 2025-07-09 | End: 2025-07-15

## 2025-07-09 ASSESSMENT — ENCOUNTER SYMPTOMS
DIARRHEA: 0
SHORTNESS OF BREATH: 0
CONSTIPATION: 0
VOMITING: 0
NAUSEA: 0
COUGH: 1
ABDOMINAL PAIN: 0
CHEST TIGHTNESS: 0

## 2025-07-09 NOTE — PROGRESS NOTES
Meera Starr (:  1995) is a 30 y.o. female,Established patient, here for evaluation of the following chief complaint(s):  Cough (Fever, body aches, x4days)      ASSESSMENT/PLAN:    ICD-10-CM    1. Acute bacterial sinusitis  J01.90 amoxicillin-clavulanate (AUGMENTIN) 875-125 MG per tablet    B96.89 fluconazole (DIFLUCAN) 150 MG tablet      2. Eczema, unspecified type  L30.9 triamcinolone (KENALOG) 0.025 % cream          Patient presents for cough congestion sinus pressure  On exam: no wheezes, rhonchi, rales  DDX: Seasonal allergies vs Sinusitis   Will rx augmentin for concern for bacterial sinusitis  Patient requesting diflucan as she get Yeast infections with antibiotics.   Refill on triamcinolone sent for eczema  Please take medication as prescribed  Please go to ED if your develop shortness of breath.     SUBJECTIVE/OBJECTIVE:    History provided by:  Patient   used: No    Cough  Pertinent negatives include no chest pain, fever, rash or shortness of breath.     HPI:   30 y.o. female presents with symptoms of cough congestion body aches and fevers ongoing since 4 days. She states that she has take OTC medication with minimal relief. She states that she does not have ashtma, does not have COPD, not using inhalers. She states that she has had measured fevers that she has taken medications for.   Patient requesting refills for triamcinolone as she has eczema and states that she has not been able to get in with PCP.no rash currently.       Vitals:    25 [id number] 25 [id number]   BP: 123/78 120/76   BP Site: Left Upper Arm    Patient Position: Sitting    BP Cuff Size: Large Adult    Pulse: 86    Temp: 99 °F (37.2 °C)    TempSrc: Oral    SpO2: 95%    Weight: 73 kg (161 lb)    Height: 1.727 m (5' 8\")        Review of Systems   Constitutional:  Negative for fatigue and fever.   Respiratory:  Positive for cough. Negative for chest tightness and shortness of breath.    Cardiovascular:  Negative

## 2025-08-16 ENCOUNTER — HOSPITAL ENCOUNTER (EMERGENCY)
Age: 30
Discharge: HOME | End: 2025-08-16
Attending: EMERGENCY MEDICINE
Payer: COMMERCIAL

## 2025-08-16 VITALS
TEMPERATURE: 98.4 F | HEIGHT: 68 IN | HEART RATE: 78 BPM | DIASTOLIC BLOOD PRESSURE: 72 MMHG | WEIGHT: 155 LBS | SYSTOLIC BLOOD PRESSURE: 122 MMHG | RESPIRATION RATE: 18 BRPM | BODY MASS INDEX: 23.49 KG/M2 | OXYGEN SATURATION: 99 %

## 2025-08-16 DIAGNOSIS — I73.00 RAYNAUD'S DISEASE WITHOUT GANGRENE: ICD-10-CM

## 2025-08-16 DIAGNOSIS — R21 RASH AND OTHER NONSPECIFIC SKIN ERUPTION: ICD-10-CM

## 2025-08-16 DIAGNOSIS — M25.512 ACUTE PAIN OF LEFT SHOULDER: ICD-10-CM

## 2025-08-16 DIAGNOSIS — S46.812A STRAIN OF LEFT TRAPEZIUS MUSCLE, INITIAL ENCOUNTER: Primary | ICD-10-CM

## 2025-08-16 LAB
ANION GAP SERPL CALCULATED.3IONS-SCNC: 14 MMOL/L (ref 3–16)
BASOPHILS # BLD: 0 K/UL (ref 0–0.2)
BASOPHILS NFR BLD: 0.6 %
BUN SERPL-MCNC: 11 MG/DL (ref 7–20)
CALCIUM SERPL-MCNC: 9.7 MG/DL (ref 8.3–10.6)
CHLORIDE SERPL-SCNC: 103 MMOL/L (ref 99–110)
CO2 SERPL-SCNC: 20 MMOL/L (ref 21–32)
CREAT SERPL-MCNC: 0.6 MG/DL (ref 0.6–1.1)
DEPRECATED RDW RBC AUTO: 14.3 % (ref 12.4–15.4)
EOSINOPHIL # BLD: 0.2 K/UL (ref 0–0.6)
EOSINOPHIL NFR BLD: 2.8 %
GFR SERPLBLD CREATININE-BSD FMLA CKD-EPI: >90 ML/MIN/{1.73_M2}
GLUCOSE SERPL-MCNC: 87 MG/DL (ref 70–99)
HCT VFR BLD AUTO: 37.6 % (ref 36–48)
HGB BLD-MCNC: 12.8 G/DL (ref 12–16)
LYMPHOCYTES # BLD: 1.2 K/UL (ref 1–5.1)
LYMPHOCYTES NFR BLD: 16.6 %
MCH RBC QN AUTO: 31 PG (ref 26–34)
MCHC RBC AUTO-ENTMCNC: 34.1 G/DL (ref 31–36)
MCV RBC AUTO: 90.9 FL (ref 80–100)
MONOCYTES # BLD: 0.5 K/UL (ref 0–1.3)
MONOCYTES NFR BLD: 6.5 %
NEUTROPHILS # BLD: 5.4 K/UL (ref 1.7–7.7)
NEUTROPHILS NFR BLD: 73.5 %
PLATELET # BLD AUTO: 300 K/UL (ref 135–450)
PMV BLD AUTO: 8.5 FL (ref 5–10.5)
POTASSIUM SERPL-SCNC: 3.9 MMOL/L (ref 3.5–5.1)
RBC # BLD AUTO: 4.14 M/UL (ref 4–5.2)
SODIUM SERPL-SCNC: 137 MMOL/L (ref 136–145)
WBC # BLD AUTO: 7.4 K/UL (ref 4–11)

## 2025-08-16 PROCEDURE — 99283 EMERGENCY DEPT VISIT LOW MDM: CPT

## 2025-08-16 PROCEDURE — 85025 COMPLETE CBC W/AUTO DIFF WBC: CPT

## 2025-08-16 PROCEDURE — 6370000000 HC RX 637 (ALT 250 FOR IP)

## 2025-08-16 PROCEDURE — 80048 BASIC METABOLIC PNL TOTAL CA: CPT

## 2025-08-16 RX ORDER — METHYLPREDNISOLONE 4 MG/1
4 TABLET ORAL SEE ADMIN INSTRUCTIONS
Qty: 1 KIT | Refills: 0 | Status: SHIPPED | OUTPATIENT
Start: 2025-08-16 | End: 2025-08-22

## 2025-08-16 RX ORDER — LIDOCAINE 4 G/G
1 PATCH TOPICAL DAILY
Status: DISCONTINUED | OUTPATIENT
Start: 2025-08-16 | End: 2025-08-16 | Stop reason: HOSPADM

## 2025-08-16 RX ORDER — CEPHALEXIN 500 MG/1
500 CAPSULE ORAL 2 TIMES DAILY
Qty: 14 CAPSULE | Refills: 0 | Status: SHIPPED | OUTPATIENT
Start: 2025-08-16 | End: 2025-08-23

## 2025-08-16 RX ORDER — LIDOCAINE 50 MG/G
1 PATCH TOPICAL DAILY
Qty: 10 PATCH | Refills: 0 | Status: SHIPPED | OUTPATIENT
Start: 2025-08-16 | End: 2025-08-26

## 2025-08-16 RX ORDER — PREDNISONE 20 MG/1
40 TABLET ORAL ONCE
Status: COMPLETED | OUTPATIENT
Start: 2025-08-16 | End: 2025-08-16

## 2025-08-16 RX ORDER — CYCLOBENZAPRINE HCL 10 MG
10 TABLET ORAL 3 TIMES DAILY PRN
Qty: 21 TABLET | Refills: 0 | Status: SHIPPED | OUTPATIENT
Start: 2025-08-16 | End: 2025-08-26

## 2025-08-16 RX ORDER — CYCLOBENZAPRINE HCL 10 MG
10 TABLET ORAL ONCE
Status: COMPLETED | OUTPATIENT
Start: 2025-08-16 | End: 2025-08-16

## 2025-08-16 RX ADMIN — CYCLOBENZAPRINE 10 MG: 10 TABLET, FILM COATED ORAL at 10:16

## 2025-08-16 RX ADMIN — PREDNISONE 40 MG: 20 TABLET ORAL at 10:16

## 2025-08-16 ASSESSMENT — PAIN SCALES - GENERAL
PAINLEVEL_OUTOF10: 8
PAINLEVEL_OUTOF10: 4

## 2025-08-16 ASSESSMENT — PAIN DESCRIPTION - DESCRIPTORS: DESCRIPTORS: ACHING;SHARP

## 2025-08-16 ASSESSMENT — PAIN - FUNCTIONAL ASSESSMENT
PAIN_FUNCTIONAL_ASSESSMENT: 0-10
PAIN_FUNCTIONAL_ASSESSMENT: ACTIVITIES ARE NOT PREVENTED

## 2025-08-16 ASSESSMENT — LIFESTYLE VARIABLES
HOW OFTEN DO YOU HAVE A DRINK CONTAINING ALCOHOL: NEVER
HOW MANY STANDARD DRINKS CONTAINING ALCOHOL DO YOU HAVE ON A TYPICAL DAY: PATIENT DOES NOT DRINK

## 2025-08-16 ASSESSMENT — PAIN DESCRIPTION - LOCATION: LOCATION: NECK;SHOULDER;HIP

## 2025-08-16 ASSESSMENT — PAIN DESCRIPTION - FREQUENCY: FREQUENCY: INTERMITTENT

## 2025-08-16 ASSESSMENT — PAIN DESCRIPTION - ORIENTATION: ORIENTATION: LEFT

## 2025-08-16 ASSESSMENT — PAIN DESCRIPTION - PAIN TYPE: TYPE: ACUTE PAIN

## 2025-08-16 ASSESSMENT — PAIN DESCRIPTION - ONSET: ONSET: ON-GOING

## 2025-08-24 ENCOUNTER — APPOINTMENT (OUTPATIENT)
Dept: CT IMAGING | Age: 30
End: 2025-08-24
Payer: COMMERCIAL

## 2025-08-24 ENCOUNTER — HOSPITAL ENCOUNTER (EMERGENCY)
Age: 30
Discharge: HOME | End: 2025-08-24
Payer: COMMERCIAL

## 2025-08-24 VITALS
HEART RATE: 80 BPM | HEIGHT: 68 IN | RESPIRATION RATE: 18 BRPM | DIASTOLIC BLOOD PRESSURE: 83 MMHG | SYSTOLIC BLOOD PRESSURE: 136 MMHG | BODY MASS INDEX: 23.05 KG/M2 | OXYGEN SATURATION: 100 % | TEMPERATURE: 98.2 F | WEIGHT: 152.12 LBS

## 2025-08-24 DIAGNOSIS — R10.13 DYSPEPSIA: ICD-10-CM

## 2025-08-24 DIAGNOSIS — R10.32 ABDOMINAL PAIN, LEFT LOWER QUADRANT: Primary | ICD-10-CM

## 2025-08-24 DIAGNOSIS — R93.2 ABNORMAL CT OF LIVER: ICD-10-CM

## 2025-08-24 DIAGNOSIS — N83.202 LEFT OVARIAN CYST: ICD-10-CM

## 2025-08-24 DIAGNOSIS — R21 RASH AND OTHER NONSPECIFIC SKIN ERUPTION: ICD-10-CM

## 2025-08-24 LAB
ALBUMIN SERPL-MCNC: 4.4 G/DL (ref 3.4–5)
ALBUMIN/GLOB SERPL: 1.8 {RATIO} (ref 1.1–2.2)
ALP SERPL-CCNC: 68 U/L (ref 40–129)
ALT SERPL-CCNC: 20 U/L (ref 10–40)
AMPHETAMINES UR QL SCN>1000 NG/ML: POSITIVE
ANION GAP SERPL CALCULATED.3IONS-SCNC: 13 MMOL/L (ref 3–16)
AST SERPL-CCNC: 26 U/L (ref 15–37)
BACTERIA URNS QL MICRO: ABNORMAL /HPF
BARBITURATES UR QL SCN>200 NG/ML: ABNORMAL
BASOPHILS # BLD: 0 K/UL (ref 0–0.2)
BASOPHILS NFR BLD: 0.9 %
BENZODIAZ UR QL SCN>200 NG/ML: ABNORMAL
BILIRUB SERPL-MCNC: 0.3 MG/DL (ref 0–1)
BILIRUB UR QL STRIP.AUTO: NEGATIVE
BUN SERPL-MCNC: 11 MG/DL (ref 7–20)
CALCIUM SERPL-MCNC: 9.2 MG/DL (ref 8.3–10.6)
CANNABINOIDS UR QL SCN>50 NG/ML: ABNORMAL
CHLORIDE SERPL-SCNC: 102 MMOL/L (ref 99–110)
CLARITY UR: CLEAR
CO2 SERPL-SCNC: 23 MMOL/L (ref 21–32)
COCAINE UR QL SCN: ABNORMAL
COLOR UR: ABNORMAL
CREAT SERPL-MCNC: 0.7 MG/DL (ref 0.6–1.1)
DEPRECATED RDW RBC AUTO: 14.5 % (ref 12.4–15.4)
DRUG SCREEN COMMENT UR-IMP: ABNORMAL
EOSINOPHIL # BLD: 0.1 K/UL (ref 0–0.6)
EOSINOPHIL NFR BLD: 1.4 %
EPI CELLS #/AREA URNS AUTO: 3 /HPF (ref 0–5)
FENTANYL SCREEN, URINE: ABNORMAL
GFR SERPLBLD CREATININE-BSD FMLA CKD-EPI: >90 ML/MIN/{1.73_M2}
GLUCOSE SERPL-MCNC: 91 MG/DL (ref 70–99)
GLUCOSE UR STRIP.AUTO-MCNC: NEGATIVE MG/DL
HCG UR QL: NEGATIVE
HCT VFR BLD AUTO: 37 % (ref 36–48)
HEMOCCULT STL QL: NORMAL
HGB BLD-MCNC: 12.8 G/DL (ref 12–16)
HGB UR QL STRIP.AUTO: NEGATIVE
HYALINE CASTS #/AREA URNS AUTO: 0 /LPF (ref 0–8)
KETONES UR STRIP.AUTO-MCNC: ABNORMAL MG/DL
LEUKOCYTE ESTERASE UR QL STRIP.AUTO: NEGATIVE
LIPASE SERPL-CCNC: 17 U/L (ref 13–60)
LYMPHOCYTES # BLD: 1.9 K/UL (ref 1–5.1)
LYMPHOCYTES NFR BLD: 35.1 %
MCH RBC QN AUTO: 31.2 PG (ref 26–34)
MCHC RBC AUTO-ENTMCNC: 34.6 G/DL (ref 31–36)
MCV RBC AUTO: 90.3 FL (ref 80–100)
METHADONE UR QL SCN>300 NG/ML: ABNORMAL
MONOCYTES # BLD: 0.7 K/UL (ref 0–1.3)
MONOCYTES NFR BLD: 12.3 %
NEUTROPHILS # BLD: 2.7 K/UL (ref 1.7–7.7)
NEUTROPHILS NFR BLD: 50.3 %
NITRITE UR QL STRIP.AUTO: NEGATIVE
OPIATES UR QL SCN>300 NG/ML: ABNORMAL
OXYCODONE UR QL SCN: ABNORMAL
PCP UR QL SCN>25 NG/ML: ABNORMAL
PH UR STRIP.AUTO: 5 [PH] (ref 5–8)
PH UR STRIP: 5 [PH]
PLATELET # BLD AUTO: 267 K/UL (ref 135–450)
PMV BLD AUTO: 7.5 FL (ref 5–10.5)
POTASSIUM SERPL-SCNC: 3.7 MMOL/L (ref 3.5–5.1)
PROT SERPL-MCNC: 6.9 G/DL (ref 6.4–8.2)
PROT UR STRIP.AUTO-MCNC: NEGATIVE MG/DL
RBC # BLD AUTO: 4.1 M/UL (ref 4–5.2)
RBC CLUMPS #/AREA URNS AUTO: 1 /HPF (ref 0–4)
SODIUM SERPL-SCNC: 138 MMOL/L (ref 136–145)
SP GR UR STRIP.AUTO: 1.03 (ref 1–1.03)
UA DIPSTICK W REFLEX MICRO PNL UR: ABNORMAL
URN SPEC COLLECT METH UR: ABNORMAL
UROBILINOGEN UR STRIP-ACNC: 1 E.U./DL
WBC # BLD AUTO: 5.4 K/UL (ref 4–11)
WBC #/AREA URNS AUTO: 1 /HPF (ref 0–5)

## 2025-08-24 PROCEDURE — 96375 TX/PRO/DX INJ NEW DRUG ADDON: CPT

## 2025-08-24 PROCEDURE — 6360000002 HC RX W HCPCS: Performed by: PHYSICIAN ASSISTANT

## 2025-08-24 PROCEDURE — 87336 ENTAMOEB HIST DISPR AG IA: CPT

## 2025-08-24 PROCEDURE — 87506 IADNA-DNA/RNA PROBE TQ 6-11: CPT

## 2025-08-24 PROCEDURE — 96374 THER/PROPH/DIAG INJ IV PUSH: CPT

## 2025-08-24 PROCEDURE — 2500000003 HC RX 250 WO HCPCS: Performed by: PHYSICIAN ASSISTANT

## 2025-08-24 PROCEDURE — 82270 OCCULT BLOOD FECES: CPT

## 2025-08-24 PROCEDURE — 86780 TREPONEMA PALLIDUM: CPT

## 2025-08-24 PROCEDURE — 74177 CT ABD & PELVIS W/CONTRAST: CPT

## 2025-08-24 PROCEDURE — 84703 CHORIONIC GONADOTROPIN ASSAY: CPT

## 2025-08-24 PROCEDURE — 85025 COMPLETE CBC W/AUTO DIFF WBC: CPT

## 2025-08-24 PROCEDURE — 0065U SYFLS TST NONTREPONEMAL ANTB: CPT

## 2025-08-24 PROCEDURE — 80053 COMPREHEN METABOLIC PANEL: CPT

## 2025-08-24 PROCEDURE — 87328 CRYPTOSPORIDIUM AG IA: CPT

## 2025-08-24 PROCEDURE — 36415 COLL VENOUS BLD VENIPUNCTURE: CPT

## 2025-08-24 PROCEDURE — 6360000004 HC RX CONTRAST MEDICATION: Performed by: PHYSICIAN ASSISTANT

## 2025-08-24 PROCEDURE — 83690 ASSAY OF LIPASE: CPT

## 2025-08-24 PROCEDURE — 81001 URINALYSIS AUTO W/SCOPE: CPT

## 2025-08-24 PROCEDURE — 99285 EMERGENCY DEPT VISIT HI MDM: CPT

## 2025-08-24 PROCEDURE — 80307 DRUG TEST PRSMV CHEM ANLYZR: CPT

## 2025-08-24 RX ORDER — IOPAMIDOL 755 MG/ML
75 INJECTION, SOLUTION INTRAVASCULAR
Status: COMPLETED | OUTPATIENT
Start: 2025-08-24 | End: 2025-08-24

## 2025-08-24 RX ORDER — ONDANSETRON 2 MG/ML
4 INJECTION INTRAMUSCULAR; INTRAVENOUS ONCE
Status: COMPLETED | OUTPATIENT
Start: 2025-08-24 | End: 2025-08-24

## 2025-08-24 RX ORDER — ALBENDAZOLE 200 MG/1
400 TABLET, FILM COATED ORAL ONCE
Qty: 2 TABLET | Refills: 0 | Status: SHIPPED | OUTPATIENT
Start: 2025-08-24 | End: 2025-08-24

## 2025-08-24 RX ORDER — PANTOPRAZOLE SODIUM 40 MG/1
40 TABLET, DELAYED RELEASE ORAL
Qty: 90 TABLET | Refills: 1 | Status: SHIPPED | OUTPATIENT
Start: 2025-08-24

## 2025-08-24 RX ADMIN — FAMOTIDINE 20 MG: 10 INJECTION, SOLUTION INTRAVENOUS at 20:55

## 2025-08-24 RX ADMIN — ONDANSETRON 4 MG: 2 INJECTION, SOLUTION INTRAMUSCULAR; INTRAVENOUS at 20:55

## 2025-08-24 RX ADMIN — IOPAMIDOL 75 ML: 755 INJECTION, SOLUTION INTRAVENOUS at 21:37

## 2025-08-25 LAB
CRYPTOSP AG STL QL IA: NORMAL
E HISTOLYT AG STL QL IA: NORMAL
G LAMBLIA AG STL QL IA: NORMAL
REAGIN+T PALLIDUM IGG+IGM SERPL-IMP: NORMAL

## 2025-08-26 LAB
GI PATHOGENS PNL STL NAA+PROBE: NORMAL
T PALLIDUM IGG SER QL IF: NON REACTIVE